# Patient Record
Sex: FEMALE | Race: WHITE | NOT HISPANIC OR LATINO | Employment: FULL TIME | URBAN - METROPOLITAN AREA
[De-identification: names, ages, dates, MRNs, and addresses within clinical notes are randomized per-mention and may not be internally consistent; named-entity substitution may affect disease eponyms.]

---

## 2017-07-28 PROBLEM — O02.1 ABORTION, MISSED: Status: ACTIVE | Noted: 2017-07-28

## 2017-11-20 ENCOUNTER — OFFICE VISIT (OUTPATIENT)
Dept: FAMILY MEDICINE | Facility: CLINIC | Age: 32
End: 2017-11-20
Payer: COMMERCIAL

## 2017-11-20 VITALS
DIASTOLIC BLOOD PRESSURE: 68 MMHG | RESPIRATION RATE: 14 BRPM | BODY MASS INDEX: 24.42 KG/M2 | HEART RATE: 60 BPM | WEIGHT: 143.06 LBS | HEIGHT: 64 IN | TEMPERATURE: 98 F | SYSTOLIC BLOOD PRESSURE: 115 MMHG | OXYGEN SATURATION: 98 %

## 2017-11-20 DIAGNOSIS — M62.838 MUSCLE SPASM: Primary | ICD-10-CM

## 2017-11-20 PROCEDURE — 99203 OFFICE O/P NEW LOW 30 MIN: CPT | Mod: S$GLB,,, | Performed by: NURSE PRACTITIONER

## 2017-11-20 PROCEDURE — 99999 PR PBB SHADOW E&M-NEW PATIENT-LVL III: CPT | Mod: PBBFAC,,, | Performed by: NURSE PRACTITIONER

## 2017-11-20 RX ORDER — CYCLOBENZAPRINE HCL 10 MG
10 TABLET ORAL 3 TIMES DAILY PRN
Qty: 30 TABLET | Refills: 0 | Status: SHIPPED | OUTPATIENT
Start: 2017-11-20 | End: 2017-11-30

## 2017-11-20 NOTE — PROGRESS NOTES
Subjective:       Patient ID: Donna Nguyen is a 32 y.o. female.  First time seeing pt in clinic.  New to [facility].     Chief Complaint: Arm Pain; Shoulder Pain; and Neck Pain  Right side: shoulder, arm,  and neck pain.     Right arm, shoulder and neck pain. A stabbing/spasm type pain.  It started in the middle of the night early Friday am. Pt reports she sleep without her pillow to help with the discomfort.  Denies numbness or tingling. She used heat and NSAIDs with mild relief.          Vitals:    11/20/17 1450   BP: 115/68   Pulse: 60   Resp: 14   Temp: 98.3 °F (36.8 °C)     Review of Systems   Constitutional: Negative for chills, fatigue and fever.   Respiratory: Negative for shortness of breath.    Cardiovascular: Negative for chest pain.   Musculoskeletal:        Right arm, shoulder, and neck pain.        Objective:      Physical Exam   Constitutional: She is oriented to person, place, and time. Vital signs are normal. She appears well-developed and well-nourished. She is cooperative.   HENT:   Head: Normocephalic and atraumatic.   Right Ear: Hearing normal.   Left Ear: Hearing normal.   Mouth/Throat: Mucous membranes are normal.   Eyes: Conjunctivae, EOM and lids are normal.   Neck: Normal range of motion. Neck supple. Muscular tenderness present.   To the right side hurts a little, but no limited ROM.    Cardiovascular: Normal rate, regular rhythm, S1 normal, S2 normal and normal heart sounds.    Pulmonary/Chest: Effort normal and breath sounds normal. No respiratory distress.   Musculoskeletal: Normal range of motion.        Cervical back: She exhibits tenderness and spasm.   Neurological: She is alert and oriented to person, place, and time.   Skin: Skin is warm, dry and intact.   Psychiatric: She has a normal mood and affect. Her speech is normal and behavior is normal. Judgment and thought content normal. Cognition and memory are normal.   Nursing note and vitals reviewed.       Assessment & Plan:       Muscle spasm  -     cyclobenzaprine (FLEXERIL) 10 MG tablet; Take 1 tablet (10 mg total) by mouth 3 (three) times daily as needed for Muscle spasms.  Dispense: 30 tablet; Refill: 0    May take ibuprofen OTC as directed for discomfort prn.      Return if symptoms worsen or fail to improve.

## 2018-03-27 ENCOUNTER — PATIENT MESSAGE (OUTPATIENT)
Dept: MATERNAL FETAL MEDICINE | Facility: CLINIC | Age: 33
End: 2018-03-27

## 2018-04-16 ENCOUNTER — PATIENT MESSAGE (OUTPATIENT)
Dept: MATERNAL FETAL MEDICINE | Facility: CLINIC | Age: 33
End: 2018-04-16

## 2018-04-16 PROBLEM — O46.8X2 SUBCHORIONIC HEMORRHAGE OF PLACENTA IN SECOND TRIMESTER: Status: ACTIVE | Noted: 2018-04-16

## 2018-04-16 PROBLEM — O35.03X0 CHOROID PLEXUS CYST OF FETUS AFFECTING CARE OF MOTHER, ANTEPARTUM: Status: ACTIVE | Noted: 2018-04-16

## 2018-04-17 ENCOUNTER — PATIENT MESSAGE (OUTPATIENT)
Dept: MATERNAL FETAL MEDICINE | Facility: CLINIC | Age: 33
End: 2018-04-17

## 2018-05-15 ENCOUNTER — PATIENT MESSAGE (OUTPATIENT)
Dept: MATERNAL FETAL MEDICINE | Facility: CLINIC | Age: 33
End: 2018-05-15

## 2018-05-17 ENCOUNTER — OFFICE VISIT (OUTPATIENT)
Dept: FAMILY MEDICINE | Facility: CLINIC | Age: 33
End: 2018-05-17
Payer: COMMERCIAL

## 2018-05-17 VITALS
BODY MASS INDEX: 25.82 KG/M2 | HEART RATE: 90 BPM | WEIGHT: 151.25 LBS | SYSTOLIC BLOOD PRESSURE: 110 MMHG | TEMPERATURE: 98 F | DIASTOLIC BLOOD PRESSURE: 60 MMHG | HEIGHT: 64 IN | OXYGEN SATURATION: 97 %

## 2018-05-17 DIAGNOSIS — H61.21 CERUMEN DEBRIS ON TYMPANIC MEMBRANE OF RIGHT EAR: Primary | ICD-10-CM

## 2018-05-17 DIAGNOSIS — H92.01 RIGHT EAR PAIN: ICD-10-CM

## 2018-05-17 DIAGNOSIS — Z3A.21 21 WEEKS GESTATION OF PREGNANCY: ICD-10-CM

## 2018-05-17 PROCEDURE — 3008F BODY MASS INDEX DOCD: CPT | Mod: CPTII,S$GLB,, | Performed by: NURSE PRACTITIONER

## 2018-05-17 PROCEDURE — 99213 OFFICE O/P EST LOW 20 MIN: CPT | Mod: S$GLB,,, | Performed by: NURSE PRACTITIONER

## 2018-05-17 PROCEDURE — 99999 PR PBB SHADOW E&M-EST. PATIENT-LVL IV: CPT | Mod: PBBFAC,,, | Performed by: NURSE PRACTITIONER

## 2018-05-17 RX ORDER — PROGESTERONE 100 MG/1
CAPSULE ORAL
COMMUNITY
Start: 2018-03-14 | End: 2018-06-04 | Stop reason: ALTCHOICE

## 2018-05-17 RX ORDER — TERCONAZOLE 4 MG/G
CREAM VAGINAL
COMMUNITY
Start: 2018-03-02 | End: 2018-06-04 | Stop reason: ALTCHOICE

## 2018-05-17 RX ORDER — NITROFURANTOIN 25; 75 MG/1; MG/1
CAPSULE ORAL
COMMUNITY
Start: 2018-03-16 | End: 2018-06-04 | Stop reason: ALTCHOICE

## 2018-05-17 RX ORDER — HYDROXYZINE PAMOATE 25 MG/1
CAPSULE ORAL
COMMUNITY
Start: 2018-04-03 | End: 2018-06-04 | Stop reason: ALTCHOICE

## 2018-05-17 NOTE — PROGRESS NOTES
Subjective:       Patient ID: Donna Nguyen is a 32 y.o. female.  Pt known to me, last seen on 11/20/2017.     Chief Complaint: Otalgia (right,  started last week)  Pt reports otalgia to right ear that started last week.  Denies swimming, recent URI, or fever.   Otalgia    There is pain in the right ear. This is a new problem. The current episode started in the past 7 days. There has been no fever. Associated symptoms include a sore throat (slight). Pertinent negatives include no abdominal pain, coughing, diarrhea, ear discharge, headaches, neck pain, rhinorrhea or vomiting. She has tried nothing for the symptoms.     Vitals:    05/17/18 1527   BP: 110/60   Pulse: 90   Temp: 98.4 °F (36.9 °C)     Review of Systems   HENT: Positive for ear pain and sore throat (slight). Negative for ear discharge and rhinorrhea.    Respiratory: Negative for cough.    Gastrointestinal: Negative for abdominal pain, diarrhea and vomiting.   Musculoskeletal: Negative for neck pain.   Neurological: Negative for headaches.       Objective:      Physical Exam   Constitutional: She is oriented to person, place, and time. Vital signs are normal. She appears well-developed and well-nourished. She is cooperative.   HENT:   Head: Normocephalic and atraumatic.   Right Ear: Hearing and external ear normal.   Left Ear: Hearing, tympanic membrane, external ear and ear canal normal.   Nose: Nose normal.   Mouth/Throat: Uvula is midline, oropharynx is clear and moist and mucous membranes are normal.   Unable to visualized TM of right ear, cerumen noted.    Eyes: Conjunctivae, EOM and lids are normal.   Neck: Normal range of motion.   Cardiovascular: Normal rate, regular rhythm, S1 normal, S2 normal, normal heart sounds and normal pulses.    Pulmonary/Chest: Effort normal and breath sounds normal. No respiratory distress.   Musculoskeletal: Normal range of motion.   Lymphadenopathy:        Head (right side): No submental, no submandibular, no  tonsillar, no preauricular and no posterior auricular adenopathy present.        Head (left side): No submental, no submandibular, no tonsillar, no preauricular and no posterior auricular adenopathy present.   Neurological: She is alert and oriented to person, place, and time.   Skin: Skin is warm and dry. Capillary refill takes less than 2 seconds.   Psychiatric: She has a normal mood and affect. Her speech is normal and behavior is normal. Judgment and thought content normal.   Nursing note and vitals reviewed.      Assessment & Plan:       Cerumen debris on tympanic membrane of right ear  -     Cancel: Ear wax removal  -     Ambulatory referral to ENT    Right ear pain  -     Ambulatory referral to ENT    21 weeks gestation of pregnancy  -     Ambulatory referral to ENT    Pt declined cerumen removal in office today, due to her concerns with current pregnancy.   Referred to ENT for further evaluation and treatment.   Pt scheduled with ENT on 5/23/2018.      Follow-up if symptoms worsen or fail to improve.

## 2018-05-28 DIAGNOSIS — O35.9XX0 SUSPECTED FETAL ABNORMALITY AFFECTING MANAGEMENT OF MOTHER, ANTEPARTUM, SINGLE OR UNSPECIFIED FETUS: Primary | ICD-10-CM

## 2018-06-04 ENCOUNTER — OFFICE VISIT (OUTPATIENT)
Dept: PEDIATRIC CARDIOLOGY | Facility: CLINIC | Age: 33
End: 2018-06-04
Attending: PEDIATRICS
Payer: COMMERCIAL

## 2018-06-04 ENCOUNTER — CLINICAL SUPPORT (OUTPATIENT)
Dept: PEDIATRIC CARDIOLOGY | Facility: CLINIC | Age: 33
End: 2018-06-04
Payer: COMMERCIAL

## 2018-06-04 VITALS
DIASTOLIC BLOOD PRESSURE: 70 MMHG | WEIGHT: 154.56 LBS | BODY MASS INDEX: 26.39 KG/M2 | HEIGHT: 64 IN | SYSTOLIC BLOOD PRESSURE: 118 MMHG | HEART RATE: 90 BPM

## 2018-06-04 DIAGNOSIS — O35.03X0 CHOROID PLEXUS CYST OF FETUS AFFECTING CARE OF MOTHER, ANTEPARTUM, SINGLE OR UNSPECIFIED FETUS: Primary | ICD-10-CM

## 2018-06-04 DIAGNOSIS — Z34.92 PREGNANT AND NOT YET DELIVERED IN SECOND TRIMESTER: ICD-10-CM

## 2018-06-04 DIAGNOSIS — O35.9XX0 SUSPECTED FETAL ABNORMALITY AFFECTING MANAGEMENT OF MOTHER, ANTEPARTUM, SINGLE OR UNSPECIFIED FETUS: ICD-10-CM

## 2018-06-04 PROCEDURE — 3008F BODY MASS INDEX DOCD: CPT | Mod: CPTII,S$GLB,, | Performed by: PEDIATRICS

## 2018-06-04 PROCEDURE — 99203 OFFICE O/P NEW LOW 30 MIN: CPT | Mod: 25,S$GLB,, | Performed by: PEDIATRICS

## 2018-06-04 PROCEDURE — 76825 ECHO EXAM OF FETAL HEART: CPT | Mod: S$GLB,,, | Performed by: PEDIATRICS

## 2018-06-04 PROCEDURE — 76827 ECHO EXAM OF FETAL HEART: CPT | Mod: S$GLB,,, | Performed by: PEDIATRICS

## 2018-06-04 PROCEDURE — 93325 DOPPLER ECHO COLOR FLOW MAPG: CPT | Mod: S$GLB,,, | Performed by: PEDIATRICS

## 2018-06-04 PROCEDURE — 99999 PR PBB SHADOW E&M-EST. PATIENT-LVL III: CPT | Mod: PBBFAC,,, | Performed by: PEDIATRICS

## 2018-06-04 NOTE — LETTER
June 5, 2018        Giuseppe Samuel MD  [address]  [facility] [address]             [facility]  [address]  [facility] [address]  Phone: [phone]  Fax: [phone]   Patient: Donna Nguyen   MR Number: [id number]   YOB: 1985   Date of Visit: 6/4/2018       Dear Dr. Samuel:    Thank you for referring Donna Nguyen to me for evaluation. Below are the relevant portions of my assessment and plan of care.            If you have questions, please do not hesitate to call me. I look forward to following Donna along with you.    Sincerely,      Kartik Michaud MD           CC  No Recipients

## 2018-06-05 PROBLEM — Z34.92 PREGNANT AND NOT YET DELIVERED IN SECOND TRIMESTER: Status: ACTIVE | Noted: 2018-06-05

## 2018-06-05 NOTE — PROGRESS NOTES
I had the pleasure of evaluating Donna Nguyen who is now 32 y.o.  and carrying her 3rd pregnancy at 23 3/7 weeks gestation. She was referred for evaluation of the fetal heart due to increased risks for congenital heart disease associated with fetal cystic hygroma with maternal cell free DNA testing reported as normal.    No current outpatient prescriptions on file.  Review of patient's allergies indicates:   Allergen Reactions    No known drug allergies        Maternal factors increasing risk for congenital heart disease: One previous fetal loss.  Paternal factors increasing risk for congenital heart disease: Unremarkable.    FETAL ECHOCARDIOGRAM (preliminary):  Normal fetal cardiac connections and function for EGA.  (Full report in electronic medical record)    I reviewed the strengths and weaknesses of fetal echocardiograph including the insufficient sensitivity to rule out many septal defects, anomalies of pulmonary and systemic veins, arch anomalies, and some valvar abnormalities. I also discussed that  resolution of the ductus arteriosus and foramen ovale (normal fetal structures) cannot be assured by fetal evaluation. Finally, I reviewed today's echocardiogram that demonstrates normal anatomical connections and function for the estimated gestational age. Within the limitations imposed by fetal physiology, I would expect a high probability that this infant will be born with a normal heart.    I reviewed the current opinion regarding choroid plexus cysts. Isolated choroid plexus cysts are present in 1 to 2 percent of the normal population. In the absence of other signs of a fetal abnormality there is no significant risk for aneuploidy. Some believe that the risk for aneuploidy in this low risk group is lower than the expected complication rate for amniocentesis and would not recommend this procedure. In addition, Ms. Nguyen has an unremarkable maternal cell free DNA test providing further evidence  that there is no aneuploidy. The risk for congenital heart disease in this circumstance is not different from an otherwise uncomplicated pregnancy.     First trimester cystic hygromas are often associated with trisomies (21,18,13), whereas second trimester cystic hygromas are often associated with monosomy X. About 50 percent of first trimester septated cystic hygromas occur in fetuses with chromosomal abnormalities, most commonly trisomy 21]. Chromosomal abnormalities appear to be more frequent in septated cystic hygromas than in nonseptated cystic hygromas (incidence of abnormalities 57 versus 21 percent) , although these findings have not been consistent across studies. As an example, one study of simple first trimester cystic hygromas measuring ?2 millimeters reported 60 percent were associated with abnormal karyotypes, with trisomy 21 occurring in one-fourth of cases. The lack of a standard definition of cystic hygroma and differences in adjustment for size likely contribute to disparate published results. Of note, about one-third of euploid fetuses with first trimester septated cystic hygromas have major structural anomalies . (Abstracted from Up To Date 10/2015)  As noted, Ms. Nguyen has an unremarkable maternal cell free DNA test providing further evidence that there is no aneuploidy.    I answered all the family's questions. I have not scheduled another evaluation; however, if questions arise later during gestation or after delivery, I would be happy to assist in any way. Ms. Nguyen is comfortable with the plan.    RECOMMENDATIONS:  Evaluation of the infant after delivery if the clinical exam is abnormal        Note:  Over 50% of visit in consultation with the family >30 minutes

## 2018-06-22 ENCOUNTER — PATIENT MESSAGE (OUTPATIENT)
Dept: MATERNAL FETAL MEDICINE | Facility: CLINIC | Age: 33
End: 2018-06-22

## 2018-06-22 ENCOUNTER — TELEPHONE (OUTPATIENT)
Dept: MATERNAL FETAL MEDICINE | Facility: CLINIC | Age: 33
End: 2018-06-22

## 2018-06-22 NOTE — TELEPHONE ENCOUNTER
Dr. Samuel contacting patient to discuss ultrasound concerns.    ----- Message from Tatianna Baker sent at 6/22/2018 10:13 AM CDT -----  Contact: Self  Pt can be reached at [phone]    Pt has a Question about a measurement on last US

## 2018-07-10 PROBLEM — O24.410 DIET CONTROLLED GESTATIONAL DIABETES MELLITUS (GDM) IN THIRD TRIMESTER: Status: ACTIVE | Noted: 2018-07-10

## 2018-09-03 PROBLEM — N89.8 VAGINAL DISCHARGE: Status: ACTIVE | Noted: 2018-09-03

## 2018-10-22 ENCOUNTER — PATIENT MESSAGE (OUTPATIENT)
Dept: MATERNAL FETAL MEDICINE | Facility: CLINIC | Age: 33
End: 2018-10-22

## 2018-11-01 LAB
HPV, HIGH-RISK: NEGATIVE
PAP SMEAR: NORMAL

## 2019-02-04 ENCOUNTER — OFFICE VISIT (OUTPATIENT)
Dept: PSYCHIATRY | Facility: CLINIC | Age: 34
End: 2019-02-04
Payer: COMMERCIAL

## 2019-02-04 VITALS
DIASTOLIC BLOOD PRESSURE: 72 MMHG | WEIGHT: 144.13 LBS | SYSTOLIC BLOOD PRESSURE: 123 MMHG | BODY MASS INDEX: 24.61 KG/M2 | HEART RATE: 64 BPM | HEIGHT: 64 IN

## 2019-02-04 DIAGNOSIS — F41.1 GAD (GENERALIZED ANXIETY DISORDER): ICD-10-CM

## 2019-02-04 PROCEDURE — 99999 PR PBB SHADOW E&M-EST. PATIENT-LVL III: ICD-10-PCS | Mod: PBBFAC,,, | Performed by: NURSE PRACTITIONER

## 2019-02-04 PROCEDURE — 99999 PR PBB SHADOW E&M-EST. PATIENT-LVL III: CPT | Mod: PBBFAC,,, | Performed by: NURSE PRACTITIONER

## 2019-02-04 PROCEDURE — 90792 PR PSYCHIATRIC DIAGNOSTIC EVALUATION W/MEDICAL SERVICES: ICD-10-PCS | Mod: S$GLB,,, | Performed by: NURSE PRACTITIONER

## 2019-02-04 PROCEDURE — 90792 PSYCH DIAG EVAL W/MED SRVCS: CPT | Mod: S$GLB,,, | Performed by: NURSE PRACTITIONER

## 2019-02-04 RX ORDER — LEVONORGESTREL AND ETHINYL ESTRADIOL 0.1-0.02MG
KIT ORAL
COMMUNITY
Start: 2019-02-02

## 2019-02-04 RX ORDER — SERTRALINE HYDROCHLORIDE 100 MG/1
TABLET, FILM COATED ORAL
COMMUNITY
Start: 2019-01-09 | End: 2019-04-11

## 2019-02-04 NOTE — PROGRESS NOTES
"Outpatient Psychiatry Initial Visit  02/04/2019    ID: 33 year old , female presenting for an initial evaluation. Met with patient.    Reason for encounter: Self-referral. Patient complains of anxiety.    History of Present Illness:  Pt. is a  34 y/o female with a hx of anxiety, presenting to the clinic for an initial evaluation and treatment. She is currently taking Zoloft 100mg QD, which was prescribed by her OB-GYN following the birth of her second child on 09/03/2018, due to post-partum depression. She reports that this medication has possibly made her anxiety slightly worse. It has also made her "apathetic and emotionless." She reports that she would like to d/c the Zoloft and try something new. Previous med trials include Lexapro (prescribed at age 17 for anxiety, reports efficacy), Pristiq (was unsure if this worked due to concurrent use with Vyvanse), and Vyvanse.     Pt reports being an anxious person most of her life. "I was one of those people who thought they had every sickness in the book. I was always worried about getting cancer. I would watch the news and get worried that things happening on TV would happen to me and my . I would have a difficult time going to the grocery store because I was afraid of getting robbed or something. I've never really been depressed, just had lots of anxiety throughout my life."    Had a miscarriage in July of 2017. She felt that the anxiety stemming from this would prevent her from having another child. However, pt became pregnant again in early 2018. A few months before her due date, her OBGYN discovered a cystic hygroma, but this was resolved and pt had a successful delivery of a healthy baby.     I don't worry about my own health anymore. Now, I just have this extreme anxiety that my children are going to get sick. For some reason, I am always worried that they are going to get cancer or something. The worry just consumes me. I don't have any energy or " "motivation anymore. I can't concentrate on anything."    "I do find myself getting more irritable with people recently"     Pt currently endorses or denies the following symptoms:  Psych ROS:  Depression: Mood: "ok", Sleep: 9 hours sleep nightly, + anhedonia, no guilt, dec'd energy, dec'd concentration, good appetite, no psychomotor slowing, no SI  Anxiety: no panic attacks, some agoraphobia, no social anxiety, lots of generalized worry, + muscle tension in shoulders, + irritability, + fatigue, decreased concentration  PTSD: no flashbacks, nightmares, or avoidance of stimuli  Deysi/Psychosis: No manic episodes, no A/V hallucinations  SI - No SI    Past Psychiatric History:  Past Psych Hx: First psych contact @ age 17 - dx anxiety. No prior hospitalizations. No prior suicide attempts or self harm.  Prior diagnosis: RENETTA  Prior meds: Pristiq, Lexapro, Vyvanse  Current meds: None  Prior psychotherapy: Yes, after miscarriage       Past Medical Hx: Hx of TBI?  No   Hx of seizures? No    Past Surgical Hx: Two C-sections, one DNC    Family Hx:   Maternal - no family hx of mental illness, suicide, or substance abuse  Paternal - dad deals with anxiety, no substance abuse or suicidal hx    Social Hx:   Childhood: B/R in [facility], by both parents, only child  Marital Status:  x 5 years to Jose. He owns a business in [employer]  Children: 2 children one miscarriage in 2017  Resides: [place]  Occupation: Stay-at-home mom  Hobbies: None  Scientologist: Protestant  Education level:  degree  : No  Legal: No    Substance Hx:  Tobacco: No, former smoker  - last used 10 years ago  Alcohol: Drinks a glass of wine every night  Drug use: none  Caffeine: 1-2 cups coffee daily  Rehab: no  Prior current AA? No    Review of Symptoms  GENERAL: no weight gain/loss  SKIN: no rashes or lacerations  HEAD: no headahces  EYES: no jaundice, blindness. No exophthalmos  EARS: no dizziness, tinnitus, or hearing " "loss  NOSE: no changes in smell  Mouth/throat: no dyskinetic movements or obvious goiter  CHEST: no SOB, hyperventilation or cough  CARDIO: no tachycardia, bradycardia, or chest pain  ABDOMEN: no nausea, vomiting, pain, constipation, or diarrhea  URINARY:  no frequency, dysuria, or sexual dysfunction  ENDOCRINE: No polydipsia, polyuria, no cold/hot intolerance  MUSCULOSKELETAL: no joint pain/stiffness  NEUROLOGIC: no weakness or sensory changes, no seizures, no confusion, memory loss, or forgetfulness, no tremor or abnormal movements    Current Evaluation:  Nutritional Screening:  Considering the patient's height and weight, medications, medical history and preferences, should a referral be made to the dietitian? No  Vitals: most recent vitals signs, dated greater than 90 days prior to this appointment, were reviewed  General: age appropriate, well nourished, casually dressed, neatly groomed  MSK: muscle strength/tone : no tremor or abnormal movements. Gait/Station: no ataxic, steady    Suicide Risk Assessment:  Protective factors: age, gender, no prior attempts, no prior hospitalizations, no ongoing substance abuse, no psychosis, , has children, denies SI/intent/plan, seeking treatment, access to treatment, future oriented, good primary support, no access to firearms.    Patient is a low immediate and long-term risk considering risk factors    Psychiatric:  Speech: Normal rate, rhythm, volume. No latency, no pressured speech  Mood/Affect: depressed, congruent and appropriate   Though Process: organized, logical, linear  Thought Content: no suicidal or homicidal ideation, no A/V hallucinations, delusions or paranoia  Insight: Intact; aware of illness  Judgement: behavior is adequate to circumstances  Orientation: A&O x 4  Memory: Intact for content of interview, 3/3 immediate, 3/3 after 3 mins. Able to recall recent and remote events.  Language: Grossly intact, no aphasias   Concentration: Spells "world" " "correctly forward & backwards  Knowledge/Intelligence: appropriate to age and level of education.   Spouse/Partner: Supportive    ASSESSMENT - DIAGNOSIS - TREATMENT PLAN  Impression: Pt. is a  34 y/o female with a hx of anxiety, presenting to the clinic for an initial evaluation and treatment. She has a history of miscarriage in 2017. She is currently taking Zoloft 100mg QD, which was prescribed by her OB-GYN following the birth of her second child on 09/03/2018, due to post-partum depression. She reports that this medication has possibly made her anxiety slightly worse. It has also made her "apathetic and emotionless." She reports that she would like to d/c the Zoloft and try something new. Previous med trials include Lexapro (prescribed at age 17 for anxiety, reports efficacy), Pristiq (was unsure if this worked due to concurrent use with Vyvanse), and Vyvanse.     Pt has long experienced anxiety and characterizes herself as a "worrier". Following a miscarriage in 2017, her constant worry of herself getting cancer has now turned into worry about her children getting cancer. She endorses low motivation, poor energy, poor concentration, irritability, muscle tension.     Pt explains that she would like to taper of the Zoloft and try something new to treat her anxiety.    Safe for outpatient tx and no acute safety concerns.  Diagnosis/Diagnoses: RENETTA    Strengths/Liabilities: Patient accepts feedback & guidance. Patient is motivated for change.     Treatment Goals: Specify outcomes written in observable, behavioral terms  Anxiety: acquire relapse prevention skills, reduce physical symptoms of anxiety, reduce time spent worrying (>30 minutes/day)    Treatment Plan/Recommendations:   Medication Management: The risks and benefits of medication were discussed with the patient.   Meds:  1) Begin taper off Zoloft. Pt instructed to take 1/2 of her 100mg tablet  x 2 weeks, then stop. Pt expresses desire to taper off this       "       medication sooner rather than later.     Labs: none ordered at today's visit  Return to Clinic: 4 weeks  Counseling time: 35 mins  Total time: 60 mins  - Call to report any worsening of symptoms or problems associated with medication  - Pt instructed to go to ER if thoughts of harming self or others arise     -Spent 60min face to face with the pt; >50% time spent in counseling   -Supportive therapy and psychoeducation provided  -R/B/SE's of medications discussed with the pt who expresses understanding and chooses to take medications as prescribed.   -Pt instructed to call clinic, 911 or go to nearest emergency room if sxs worsen or pt is in   crisis. The pt expresses understanding.    Waldemar Michele, NP

## 2019-02-28 ENCOUNTER — PATIENT MESSAGE (OUTPATIENT)
Dept: PSYCHIATRY | Facility: CLINIC | Age: 34
End: 2019-02-28

## 2019-03-07 ENCOUNTER — OFFICE VISIT (OUTPATIENT)
Dept: PSYCHIATRY | Facility: CLINIC | Age: 34
End: 2019-03-07
Payer: COMMERCIAL

## 2019-03-07 VITALS
WEIGHT: 144 LBS | HEIGHT: 64 IN | BODY MASS INDEX: 24.59 KG/M2 | SYSTOLIC BLOOD PRESSURE: 114 MMHG | DIASTOLIC BLOOD PRESSURE: 61 MMHG | HEART RATE: 76 BPM

## 2019-03-07 DIAGNOSIS — F41.1 GAD (GENERALIZED ANXIETY DISORDER): Primary | ICD-10-CM

## 2019-03-07 PROCEDURE — 99213 OFFICE O/P EST LOW 20 MIN: CPT | Mod: S$GLB,,, | Performed by: NURSE PRACTITIONER

## 2019-03-07 PROCEDURE — 99213 PR OFFICE/OUTPT VISIT, EST, LEVL III, 20-29 MIN: ICD-10-PCS | Mod: S$GLB,,, | Performed by: NURSE PRACTITIONER

## 2019-03-07 PROCEDURE — 3008F BODY MASS INDEX DOCD: CPT | Mod: CPTII,S$GLB,, | Performed by: NURSE PRACTITIONER

## 2019-03-07 PROCEDURE — 90833 PSYTX W PT W E/M 30 MIN: CPT | Mod: S$GLB,,, | Performed by: NURSE PRACTITIONER

## 2019-03-07 PROCEDURE — 3008F PR BODY MASS INDEX (BMI) DOCUMENTED: ICD-10-PCS | Mod: CPTII,S$GLB,, | Performed by: NURSE PRACTITIONER

## 2019-03-07 PROCEDURE — 90833 PR PSYCHOTHERAPY W/PATIENT W/E&M, 30 MIN (ADD ON): ICD-10-PCS | Mod: S$GLB,,, | Performed by: NURSE PRACTITIONER

## 2019-03-07 PROCEDURE — 99999 PR PBB SHADOW E&M-EST. PATIENT-LVL III: CPT | Mod: PBBFAC,,, | Performed by: NURSE PRACTITIONER

## 2019-03-07 PROCEDURE — 99999 PR PBB SHADOW E&M-EST. PATIENT-LVL III: ICD-10-PCS | Mod: PBBFAC,,, | Performed by: NURSE PRACTITIONER

## 2019-03-07 RX ORDER — BUPROPION HYDROCHLORIDE 150 MG/1
150 TABLET ORAL DAILY
Qty: 30 TABLET | Refills: 3 | Status: SHIPPED | OUTPATIENT
Start: 2019-03-07 | End: 2019-04-11

## 2019-03-07 NOTE — PROGRESS NOTES
"Outpatient Psychiatry Follow-Up Visit    Clinical Status of Patient: Outpatient (Ambulatory)  03/07/2019     Chief Complaint: Pt is a 33 year old female who presents today for a follow-up. Met with patient.       Interval History and Content of Current Session:  Interim Events/Subjective Report/Content of Current Session: follow up appointment.    Pt is a with past psychiatric hx of RENETTA presents for follow up treatment. Per pt request, we started tapering off Zoloft during our last appointment. Today, she has completely tapered off and has tolerated well. Pt reached out to me via [employer] with questions about Wellbutrin, and expresses her desire to start trial of of this. Pt was informed that Wellbutrin-XL is not FDA approved to treat RENETTA, but she insists that we try this, and that she does frequently experience a depressed mood.      "I did a lot of research on Wellbutrin, and it sounds like something I'd like to try. I read that it sometimes can make people more anxious, but I think I can handle that. I've also had trouble staying focused, since I was a child." She also speaks to not wanting the emotional flattening effects of SSRIs.     Sleep is good, has been using magnesium to get to sleep. Continues to experience some fatigue, and dec'd concentration. Appetite is good.       Past Psychiatric hx: Pt. is a  34 y/o female with a hx of anxiety, presenting to the clinic for an initial evaluation and treatment. She has a history of miscarriage in 2017. She is currently taking Zoloft 100mg QD, which was prescribed by her OB-GYN following the birth of her second child on 09/03/2018, due to post-partum depression. She reports that this medication has possibly made her anxiety slightly worse. It has also made her "apathetic and emotionless." She reports that she would like to d/c the Zoloft and try something new. Previous med trials include Lexapro (prescribed at age 17 for anxiety, reports efficacy), Pristiq (was unsure " "if this worked due to concurrent use with Vyvanse), and Vyvanse.      Pt has long experienced anxiety and characterizes herself as a "worrier". Following a miscarriage in 2017, her constant worry of herself getting cancer has now turned into worry about her children getting cancer. She endorses low motivation, poor energy, poor concentration, irritability, muscle tension.       Past Medical hx:   Past Medical History:   Diagnosis Date    ADD (attention deficit disorder)     Anxiety     Cystic hygroma     Diabetes mellitus     gdm        Interim hx:  Medication changes last visit: Taper off Zoloft per pt request     Anxiety: 6-7/10  Depression: 5/10     Denies suicidal/homicidal ideations.  Denies hopelessness/worthlessness.    Denies auditory/visual hallucinations      Tobacco: No, former smoker  - last used 10 years ago  Alcohol: Drinks a glass of wine every night  Drug use: none  Caffeine: 1-2 cups coffee daily    Review of Systems   · PSYCHIATRIC: Pertinent items are noted in the narrative.        CONSTITUTIONAL: weight stable        M/S: no pain today         ENT: no allergies noted today        ABD: no n/v/d     Past Medical, Family and Social History: The patient's past medical, family and social history have been reviewed and updated as appropriate within the electronic medical record. See encounter notes.     Medication: Recently completed taper off of Zoloft, per pt request     Compliance: yes      Side effects: tolerated taper well     Risk Parameters:  Patient reports no suicidal ideation  Patient reports no homicidal ideation  Patient reports no self-injurious behavior  Patient reports no violent behavior     Exam (detailed: at least 9 elements; comprehensive: all 15 elements)   Constitutional  Vitals:  Most recent vital signs, dated less than 90 days prior to this appointment, were reviewed. /61 (BP Location: Left arm)   Pulse 76   Ht 5' 4" (1.626 m)   Wt 65.3 kg (144 lb)   LMP 03/06/2019 " "(Exact Date)   BMI 24.72 kg/m²       General:  unremarkable, age appropriate, casual attire, good eye contact, good rapport       Musculoskeletal  Muscle Strength/Tone:  no flaccidity, no tremor    Gait & Station:  normal      Psychiatric                       Speech:  normal tone, normal rate, rhythm, and volume   Mood & Affect:   euthymic, congruent, appropriate         Thought Process:   Goal directed; Linear    Associations:   intact   Thought Content:   No SI/HI, delusions, or paranoia, no AV/VH   Insight & Judgement:   Good, adequate to circumstances   Orientation:   grossly intact; alert and oriented x 4    Memory:  intact for content of interview    Language:  grossly intact, can repeat    Attention Span  : Grossly intact for content of interview   Fund of Knowledge:   intact and appropriate to age and level of education      /61 (BP Location: Left arm)   Pulse 76   Ht 5' 4" (1.626 m)   Wt 65.3 kg (144 lb)   LMP 03/06/2019 (Exact Date)   BMI 24.72 kg/m²     Assessment and Diagnosis   Status/Progress: Based on the examination today, the patient's problem(s) is/are under fair control.  New problems have not been presented today. Comorbidities are not currently complicating management of the primary condition.      Impression:      Pt is a with past psychiatric hx of RENETTA presents for follow up treatment. Per pt request, we started tapering off Zoloft during our last appointment. Today, she has completely tapered off and has tolerated well. Pt reached out to me via [employer] with questions about Wellbutrin, and expresses her desire to start trial of of this. Pt was informed that Wellbutrin-XL is not FDA approved to treat RENETTA, but she insists that we try this, and that she does frequently experience a depressed mood.      Today, pt states that she does frequently experience a depressed mood.    Sleep is good, has been using magnesium to get to sleep. Continues to experience some fatigue, and dec'd " concentration. Appetite is good.       Diagnosis: RENETTA    Intervention/Counseling/Treatment Plan   · Medication Management:      1. Initiate Wellbutrin-XL 150mg QD per pt request. Pt educated on side effects that may include: insomnia, tremor, agitation, headache, dizziness. Also that she may experience dry mouth, constipation, or nausea. Pt verbalizes her understanding     2. Call to report any worsening of symptoms or problems with the medication. Pt instructed to go to ER with thoughts of harming self, others     3. Patient given contact # for psychotherapists at [facility] and also instructed she may check with insurance for list of providers.     6. Labs: no new orders    Psychotherapy:   · Target symptoms: anxiety  · Why chosen therapy is appropriate versus another modality: relevant to diagnosis, patient responds to this modality  · Outcome monitoring methods: self-report, observation, feedback from family   · Therapeutic intervention type: supportive psychotherapy  · Topics discussed/themes: building skills sets for symptom management, symptom recognition, nutrition, exercise  · The patient's response to the intervention is accepting. The patient's progress toward treatment goals is positive progress.  · Duration of intervention: 20 minutes     Return to clinic: 4 weeks    -Spent 30min face to face with the pt; >50% time spent in counseling   -Supportive therapy and psychoeducation provided  -R/B/SE's of medications discussed with the pt who expresses understanding and chooses to take medications as prescribed.   -Pt instructed to call clinic, 911 or go to nearest emergency room if sxs worsen or pt is in   crisis. The pt expresses understanding.    Waldemar Michele, NP

## 2019-04-11 ENCOUNTER — OFFICE VISIT (OUTPATIENT)
Dept: PSYCHIATRY | Facility: CLINIC | Age: 34
End: 2019-04-11
Payer: COMMERCIAL

## 2019-04-11 VITALS
DIASTOLIC BLOOD PRESSURE: 67 MMHG | HEART RATE: 80 BPM | RESPIRATION RATE: 16 BRPM | BODY MASS INDEX: 23.9 KG/M2 | SYSTOLIC BLOOD PRESSURE: 110 MMHG | WEIGHT: 140 LBS | HEIGHT: 64 IN

## 2019-04-11 DIAGNOSIS — F41.1 GAD (GENERALIZED ANXIETY DISORDER): Primary | ICD-10-CM

## 2019-04-11 PROCEDURE — 90833 PSYTX W PT W E/M 30 MIN: CPT | Mod: S$GLB,,, | Performed by: NURSE PRACTITIONER

## 2019-04-11 PROCEDURE — 99999 PR PBB SHADOW E&M-EST. PATIENT-LVL III: CPT | Mod: PBBFAC,,, | Performed by: NURSE PRACTITIONER

## 2019-04-11 PROCEDURE — 99213 OFFICE O/P EST LOW 20 MIN: CPT | Mod: S$GLB,,, | Performed by: NURSE PRACTITIONER

## 2019-04-11 PROCEDURE — 90833 PR PSYCHOTHERAPY W/PATIENT W/E&M, 30 MIN (ADD ON): ICD-10-PCS | Mod: S$GLB,,, | Performed by: NURSE PRACTITIONER

## 2019-04-11 PROCEDURE — 3008F PR BODY MASS INDEX (BMI) DOCUMENTED: ICD-10-PCS | Mod: CPTII,S$GLB,, | Performed by: NURSE PRACTITIONER

## 2019-04-11 PROCEDURE — 99213 PR OFFICE/OUTPT VISIT, EST, LEVL III, 20-29 MIN: ICD-10-PCS | Mod: S$GLB,,, | Performed by: NURSE PRACTITIONER

## 2019-04-11 PROCEDURE — 3008F BODY MASS INDEX DOCD: CPT | Mod: CPTII,S$GLB,, | Performed by: NURSE PRACTITIONER

## 2019-04-11 PROCEDURE — 99999 PR PBB SHADOW E&M-EST. PATIENT-LVL III: ICD-10-PCS | Mod: PBBFAC,,, | Performed by: NURSE PRACTITIONER

## 2019-04-11 RX ORDER — BUPROPION HYDROCHLORIDE 150 MG/1
150 TABLET, EXTENDED RELEASE ORAL DAILY
Qty: 30 TABLET | Refills: 3 | Status: SHIPPED | OUTPATIENT
Start: 2019-04-11 | End: 2019-05-09 | Stop reason: SDUPTHER

## 2019-04-11 NOTE — PROGRESS NOTES
"Outpatient Psychiatry Follow-Up Visit    Clinical Status of Patient: Outpatient (Ambulatory)  04/11/2019     Chief Complaint: Pt is a 33 year old female who presents today for a follow-up. Met with patient.       Interval History and Content of Current Session:  Interim Events/Subjective Report/Content of Current Session: follow up appointment.    Pt is a 33 year old female with past psychiatric hx of RENETTA who presents for follow up treatment. She is currently taking Wellbutrin-XL 150mg QD which was started during her last visit one month ago. She also reports having recently completed taper off Zoloft. Pt has been adamant about monotherapy with Wellbutrin due various forums and advice she has read online.    Pt reports she has tolerated Wellbutrin-XL trial well, but has noticed a moderate mood lift. She states that she has been reading forums where people have reported better results with SR as opposed to XL. She expressed her desire to be switched from XL to SR today.    She reports an overall decrease in anxiety levels since our last visit. She also endorses "times where I just get happy" throughout the day. Fatigue has been problematic for her in the past, but she feels the Wellbutrin has helped with this.    Sleep is good, appetite is good. Has noticed mild improvement in concentration since starting Wellbutrin.    Pt stable on current meds.         Past Psychiatric hx: : Pt. is a  34 y/o female with a hx of anxiety, presenting to the clinic for an initial evaluation and treatment. She has a history of miscarriage in 2017. She came to me taking Zoloft 100mg QD, which was prescribed by her OB-GYN following the birth of her second child on 09/03/2018, due to post-partum depression. She reports that this medication has possibly made her anxiety slightly worse. It has also made her "apathetic and emotionless." She reports that she would like to d/c the Zoloft and try something new. Previous med trials include " "Lexapro (prescribed at age 17 for anxiety, reports efficacy), Pristiq (was unsure if this worked due to concurrent use with Vyvanse), and Vyvanse.      Pt has long experienced anxiety and characterizes herself as a "worrier". Following a miscarriage in 2017, her constant worry of herself getting cancer has now turned into worry about her children getting cancer. She endorses low motivation, poor energy, poor concentration, irritability, muscle tension.          Past Medical hx:   Past Medical History:   Diagnosis Date    ADD (attention deficit disorder)     Anxiety     Cystic hygroma     Diabetes mellitus     gdm        Interim hx:  Medication changes last visit: Start Wellbutrin-XL  Anxiety: 3/10  Depression: 5/10     Denies suicidal/homicidal ideations.  Denies hopelessness/worthlessness.    Denies auditory/visual hallucinations      Alcohol: no change  Drug: no change   Caffeine: no change  Tobacco: no change      Review of Systems   · PSYCHIATRIC: Pertinent items are noted in the narrative.        CONSTITUTIONAL: weight stable        M/S: no pain today         ENT: no allergies noted today        ABD: no n/v/d     Past Medical, Family and Social History: The patient's past medical, family and social history have been reviewed and updated as appropriate within the electronic medical record. See encounter notes.     Medication: Wellbutrin-XL 150mg QD     Compliance: yes      Side effects: tolerates     Risk Parameters:  Patient reports no suicidal ideation  Patient reports no homicidal ideation  Patient reports no self-injurious behavior  Patient reports no violent behavior     Exam (detailed: at least 9 elements; comprehensive: all 15 elements)   Constitutional  Vitals:  Most recent vital signs, dated less than 90 days prior to this appointment, were reviewed. /67 (BP Location: Left arm, Patient Position: Sitting)   Pulse 80   Resp 16   Ht 5' 4" (1.626 m)   Wt 63.5 kg (140 lb)   LMP 04/04/2019 " "(Approximate)   BMI 24.03 kg/m²       General:  unremarkable, age appropriate, casual attire, good eye contact, good rapport       Musculoskeletal  Muscle Strength/Tone:  no flaccidity, no tremor    Gait & Station:  normal      Psychiatric                       Speech:  normal tone, normal rate, rhythm, and volume   Mood & Affect:   Euthymic, congruent, appropriate         Thought Process:   Goal directed; Linear    Associations:   intact   Thought Content:   No SI/HI, delusions, or paranoia, no AV/VH   Insight & Judgement:   Good, adequate to circumstances   Orientation:   grossly intact; alert and oriented x 4    Memory:  intact for content of interview    Language:  grossly intact, can repeat    Attention Span  : Grossly intact for content of interview   Fund of Knowledge:   intact and appropriate to age and level of education        Assessment and Diagnosis   Status/Progress: Based on the examination today, the patient's problem(s) is/are under fair control.  New problems have not been presented today. Comorbidities are not currently complicating management of the primary condition.      Impression:Pt is a 33 year old female with past psychiatric hx of RENETTA who presents for follow up treatment. She is currently taking Wellbutrin-XL 150mg QD which was started during her last visit one month ago. She also reports having recently completed taper off Zoloft. Pt has been adamant about monotherapy with Wellbutrin due various forums and advice she has read online.    Pt reports she has tolerated Wellbutrin-XL trial well, but has noticed a moderate mood lift. She states that she has been reading forums where people have reported better results with SR as opposed to XL. She expressed her desire to be switched from XL to SR today.    She reports an overall decrease in anxiety levels since our last visit. She also endorses "times where I just get happy" throughout the day. Fatigue has been problematic for her in the past, " but she feels the Wellbutrin has helped with this.    Sleep is good, appetite is good. Has noticed mild improvement in concentration since starting Wellbutrin.    Pt stable on current meds.       Diagnosis: RENETTA    Intervention/Counseling/Treatment Plan   · Medication Management:      1. Direct switch to Wellbutrin-SR 150mg QD per pt request.      2. Call to report any worsening of symptoms or problems with the medication. Pt instructed to go to ER with thoughts of harming self, others     3. Patient given contact # for psychotherapists at [facility] and also instructed she may check with insurance for list of providers.      4. Labs: no new orders     Psychotherapy:   · Target symptoms: anxiety  · Why chosen therapy is appropriate versus another modality: relevant to diagnosis, patient responds to this modality  · Outcome monitoring methods: self-report, observation, feedback from family   · Therapeutic intervention type: supportive psychotherapy  · Topics discussed/themes: building skills sets for symptom management, symptom recognition, nutrition, exercise  · The patient's response to the intervention is accepting. The patient's progress toward treatment goals is positive progress.  · Duration of intervention: 20 minutes     Return to clinic: 4 weeks    -Spent 30min face to face with the pt; >50% time spent in counseling   -Supportive therapy and psychoeducation provided  -R/B/SE's of medications discussed with the pt who expresses understanding and chooses to take medications as prescribed.   -Pt instructed to call clinic, 911 or go to nearest emergency room if sxs worsen or pt is in   crisis. The pt expresses understanding.    Waldemar Michele, NP

## 2019-05-09 ENCOUNTER — OFFICE VISIT (OUTPATIENT)
Dept: PSYCHIATRY | Facility: CLINIC | Age: 34
End: 2019-05-09
Payer: COMMERCIAL

## 2019-05-09 VITALS
HEART RATE: 79 BPM | WEIGHT: 137.31 LBS | RESPIRATION RATE: 16 BRPM | HEIGHT: 64 IN | SYSTOLIC BLOOD PRESSURE: 119 MMHG | BODY MASS INDEX: 23.44 KG/M2 | DIASTOLIC BLOOD PRESSURE: 78 MMHG

## 2019-05-09 DIAGNOSIS — F41.1 GAD (GENERALIZED ANXIETY DISORDER): Primary | ICD-10-CM

## 2019-05-09 PROCEDURE — 90833 PR PSYCHOTHERAPY W/PATIENT W/E&M, 30 MIN (ADD ON): ICD-10-PCS | Mod: S$GLB,,, | Performed by: NURSE PRACTITIONER

## 2019-05-09 PROCEDURE — 90833 PSYTX W PT W E/M 30 MIN: CPT | Mod: S$GLB,,, | Performed by: NURSE PRACTITIONER

## 2019-05-09 PROCEDURE — 3008F BODY MASS INDEX DOCD: CPT | Mod: CPTII,S$GLB,, | Performed by: NURSE PRACTITIONER

## 2019-05-09 PROCEDURE — 99999 PR PBB SHADOW E&M-EST. PATIENT-LVL III: ICD-10-PCS | Mod: PBBFAC,,, | Performed by: NURSE PRACTITIONER

## 2019-05-09 PROCEDURE — 99213 PR OFFICE/OUTPT VISIT, EST, LEVL III, 20-29 MIN: ICD-10-PCS | Mod: S$GLB,,, | Performed by: NURSE PRACTITIONER

## 2019-05-09 PROCEDURE — 99999 PR PBB SHADOW E&M-EST. PATIENT-LVL III: CPT | Mod: PBBFAC,,, | Performed by: NURSE PRACTITIONER

## 2019-05-09 PROCEDURE — 99213 OFFICE O/P EST LOW 20 MIN: CPT | Mod: S$GLB,,, | Performed by: NURSE PRACTITIONER

## 2019-05-09 PROCEDURE — 3008F PR BODY MASS INDEX (BMI) DOCUMENTED: ICD-10-PCS | Mod: CPTII,S$GLB,, | Performed by: NURSE PRACTITIONER

## 2019-05-09 RX ORDER — BUPROPION HYDROCHLORIDE 200 MG/1
200 TABLET, EXTENDED RELEASE ORAL DAILY
Qty: 30 TABLET | Refills: 3 | Status: SHIPPED | OUTPATIENT
Start: 2019-05-09 | End: 2019-08-31 | Stop reason: SDUPTHER

## 2019-05-09 NOTE — PROGRESS NOTES
"Outpatient Psychiatry Follow-Up Visit    Clinical Status of Patient: Outpatient (Ambulatory)  05/09/2019     Chief Complaint: Pt is a 33 year old female who presents today for a follow-up. Met with patient.       Interval History and Content of Current Session:  Interim Events/Subjective Report/Content of Current Session: follow up appointment.    Pt is a 33 year old female with past psychiatric hx of RENETTA who presents for follow up treatment. She is currently taking Wellbutrin-XL 150mg QD which was started during her last visit one month ago. She also reports having recently completed taper off Zoloft. Pt has been adamant about monotherapy with Wellbutrin due to reading various sources and forums online. At her last visit one month ago Pt reports she has tolerated Wellbutrin-XL trial well, and has noticed a moderate mood lift. She states that she has been reading forums where people have reported better results with SR as opposed to XL. She expressed her desire to be switched from XL to SR at this visit. Since switching from XL to SR formulations, she reports a modest improvement in mood and anxiety levels. She also endorses "times where I just get happy" throughout the day. Fatigue has been problematic for her in the past, but she feels the Wellbutrin has helped with this. Pt states "I was just driving around the other day and told my , wow what a beautiful day. He was like wow, where did that come from?"    Fatigue and concentration continue to be problematic for her at times, and still experiences some "down days" with no identifiable trigger.     She continues to have some generalized worry and catastrophic thinking, however this has reduced in frequency and severity     Sleep is good, appetite is good. Has noticed continued improvement with Wellbutrin. Stable on current meds.        Past Psychiatric hx: : Pt. is a  32 y/o female with a hx of anxiety, presenting to the clinic for an initial evaluation and " "treatment. She has a history of miscarriage in 2017. She came to me taking Zoloft 100mg QD, which was prescribed by her OB-GYN following the birth of her second child on 09/03/2018, due to post-partum depression. She reports that this medication has possibly made her anxiety slightly worse. It has also made her "apathetic and emotionless." She reports that she would like to d/c the Zoloft and try something new. Previous med trials include Lexapro (prescribed at age 17 for anxiety, reports efficacy), Pristiq (was unsure if this worked due to concurrent use with Vyvanse), and Vyvanse.      Pt has long experienced anxiety and characterizes herself as a "worrier". Following a miscarriage in 2017, her constant worry of herself getting cancer has now turned into worry about her children getting cancer. She endorses low motivation, poor energy, poor concentration, irritability, muscle tension.        Past Medical hx:   Past Medical History:   Diagnosis Date    ADD (attention deficit disorder)     Anxiety     Cystic hygroma     Diabetes mellitus     gdm        Interim hx:  Medication changes last visit: Start Wellbutrin-XL  Anxiety: 5/10  Depression: 4/10     Denies suicidal/homicidal ideations.  Denies hopelessness/worthlessness.    Denies auditory/visual hallucinations      Alcohol: 4-5 nights weekly, one drink of liquor nightly  Drug: denies  Caffeine: 1 cup coffee daily  Tobacco: denies      Review of Systems   · PSYCHIATRIC: Pertinent items are noted in the narrative.        CONSTITUTIONAL: weight stable        M/S: no pain today         ENT: no allergies noted today        ABD: no n/v/d     Past Medical, Family and Social History: The patient's past medical, family and social history have been reviewed and updated as appropriate within the electronic medical record. See encounter notes.     Medication: Wellbutrin SR 200mg QD     Compliance: yes      Side effects: tolerates     Risk Parameters:  Patient reports no " "suicidal ideation  Patient reports no homicidal ideation  Patient reports no self-injurious behavior  Patient reports no violent behavior     Exam (detailed: at least 9 elements; comprehensive: all 15 elements)   Constitutional  Vitals:  Most recent vital signs, dated less than 90 days prior to this appointment, were reviewed. /78 (BP Location: Left arm, Patient Position: Sitting)   Pulse 79   Resp 16   Ht 5' 4" (1.626 m)   Wt 62.3 kg (137 lb 4.8 oz)   BMI 23.57 kg/m²      General:  unremarkable, age appropriate, casual attire, good eye contact, good rapport       Musculoskeletal  Muscle Strength/Tone:  no flaccidity, no tremor    Gait & Station:  normal      Psychiatric                       Speech:  normal tone, normal rate, rhythm, and volume   Mood & Affect:   Euthymic, congruent, appropriate         Thought Process:   Goal directed; Linear    Associations:   intact   Thought Content:   No SI/HI, delusions, or paranoia, no AV/VH   Insight & Judgement:   Good, adequate to circumstances   Orientation:   grossly intact; alert and oriented x 4    Memory:  intact for content of interview    Language:  grossly intact, can repeat    Attention Span  : Grossly intact for content of interview   Fund of Knowledge:   intact and appropriate to age and level of education        Assessment and Diagnosis   Status/Progress: Based on the examination today, the patient's problem(s) is/are under fair control.  New problems have not been presented today. Comorbidities are not currently complicating management of the primary condition.      Impression: Pt iss a 33 year old female who met criteria for RENETTA upon initial evaluation with me. She also reports having recently completed taper off Zoloft. Pt has been adamant about monotherapy with Wellbutrin due to reading various sources and forums online. At her last visit one month ago Pt reports she has tolerated Wellbutrin-XL trial well, and has noticed a moderate mood lift. " "She states that she has been reading forums where people have reported better results with SR as opposed to XL. She expressed her desire to be switched from XL to SR at this visit. Since switching from XL to SR formulations, she reports a modest improvement in mood and anxiety levels. She also endorses "times where I just get happy" throughout the day. Fatigue has been problematic for her in the past, but she feels the Wellbutrin has helped with this. Pt states "I was just driving around the other day and told my , wow what a beautiful day. He was like wow, where did that come from?"    Fatigue and concentration continue to be problematic for her at times, and still experiences some "down days" with no identifiable trigger.     She continues to have some generalized worry and catastrophic thinking, however this has reduced in frequency and severity     Sleep is good, appetite is good.     Pt stable on current meds. No panic attacks.  Diagnosis: RENETTA    Intervention/Counseling/Treatment Plan   · Medication Management:      1. Increase Wellbutrin SR to 200mg QD due to conituned sx of decreased mood and anxiety     2. Call to report any worsening of symptoms or problems with the medication. Pt instructed to go to ER with thoughts of harming self, others     3. Patient given contact # for psychotherapists at [facility] and also instructed she may check with insurance for list of providers.      4. Labs: no new orders     Psychotherapy:   · Target symptoms: anxiety  · Why chosen therapy is appropriate versus another modality: relevant to diagnosis, patient responds to this modality  · Outcome monitoring methods: self-report, observation, feedback from family   · Therapeutic intervention type: supportive psychotherapy  · Topics discussed/themes: building skills sets for symptom management, symptom recognition, nutrition, exercise  · The patient's response to the intervention is accepting. The patient's " progress toward treatment goals is positive progress.  · Duration of intervention: 20 minutes     Return to clinic: 4 weeks    -Spent 30min face to face with the pt; >50% time spent in counseling   -Supportive therapy and psychoeducation provided  -R/B/SE's of medications discussed with the pt who expresses understanding and chooses to take medications as prescribed.   -Pt instructed to call clinic, 911 or go to nearest emergency room if sxs worsen or pt is in   crisis. The pt expresses understanding.    Waldemar Michele, NP

## 2019-05-28 RX ORDER — BUPROPION HYDROCHLORIDE 150 MG/1
TABLET ORAL
Qty: 30 TABLET | Refills: 2 | OUTPATIENT
Start: 2019-05-28

## 2019-06-05 ENCOUNTER — OFFICE VISIT (OUTPATIENT)
Dept: PSYCHIATRY | Facility: CLINIC | Age: 34
End: 2019-06-05
Payer: COMMERCIAL

## 2019-06-05 VITALS
DIASTOLIC BLOOD PRESSURE: 73 MMHG | BODY MASS INDEX: 23.61 KG/M2 | WEIGHT: 138.31 LBS | SYSTOLIC BLOOD PRESSURE: 111 MMHG | HEART RATE: 81 BPM | HEIGHT: 64 IN

## 2019-06-05 DIAGNOSIS — F41.1 GAD (GENERALIZED ANXIETY DISORDER): Primary | ICD-10-CM

## 2019-06-05 PROCEDURE — 99999 PR PBB SHADOW E&M-EST. PATIENT-LVL III: ICD-10-PCS | Mod: PBBFAC,,, | Performed by: NURSE PRACTITIONER

## 2019-06-05 PROCEDURE — 90833 PR PSYCHOTHERAPY W/PATIENT W/E&M, 30 MIN (ADD ON): ICD-10-PCS | Mod: S$GLB,,, | Performed by: NURSE PRACTITIONER

## 2019-06-05 PROCEDURE — 99999 PR PBB SHADOW E&M-EST. PATIENT-LVL III: CPT | Mod: PBBFAC,,, | Performed by: NURSE PRACTITIONER

## 2019-06-05 PROCEDURE — 90833 PSYTX W PT W E/M 30 MIN: CPT | Mod: S$GLB,,, | Performed by: NURSE PRACTITIONER

## 2019-06-05 PROCEDURE — 99213 PR OFFICE/OUTPT VISIT, EST, LEVL III, 20-29 MIN: ICD-10-PCS | Mod: S$GLB,,, | Performed by: NURSE PRACTITIONER

## 2019-06-05 PROCEDURE — 99213 OFFICE O/P EST LOW 20 MIN: CPT | Mod: S$GLB,,, | Performed by: NURSE PRACTITIONER

## 2019-06-05 PROCEDURE — 3008F BODY MASS INDEX DOCD: CPT | Mod: CPTII,S$GLB,, | Performed by: NURSE PRACTITIONER

## 2019-06-05 PROCEDURE — 3008F PR BODY MASS INDEX (BMI) DOCUMENTED: ICD-10-PCS | Mod: CPTII,S$GLB,, | Performed by: NURSE PRACTITIONER

## 2019-06-05 NOTE — PROGRESS NOTES
"Outpatient Psychiatry Follow-Up Visit    Clinical Status of Patient: Outpatient (Ambulatory)  06/05/2019     Chief Complaint: Pt is a 33 year old female who presents today for a follow-up. Met with patient.       Interval History and Content of Current Session:  Interim Events/Subjective Report/Content of Current Session: follow up appointment.    Pt is a 33 year old female with past psychiatric hx of RENETTA who presents for follow up treatment. She is currently taking Wellbutrin-SR 200mg QD, after a recent transition from Wellbutrin-XL. Pt has been adamant about monotherapy with Wellbutrin due to reading various sources and forums online. I have educated her that this med can potentially increase anxiety, and is better suited for treating depression. She states that she has been reading forums where people have reported better results with SR as opposed to XL. She then expressed her desire to be switched from XL to SR Since switching from XL to SR formulations, she reports a modest improvement in mood and anxiety levels. She also endorses "times where I just get happy" throughout the day. Fatigue has been problematic for her in the past, but she feels the Wellbutrin has helped with this. Pt states "I was just driving around the other day and told my , wow what a beautiful day. He was like wow, where did that come from?"    Recently stopped drinking coffee, has noticed significant reduction in overall anxiety levels.     Fatigue and concentration continue to be problematic for her at times, and still experiences some "down days" with no identifiable trigger.     She continues to have some generalized worry and catastrophic thinking, however this has reduced in frequency and severity     Sleep is good, appetite is good. Has noticed continued improvement with Wellbutrin. Stable on current meds.      Past Psychiatric hx: : Pt. is a  34 y/o female with a hx of anxiety, presenting to the clinic for an initial " "evaluation and treatment. She has a history of miscarriage in 2017. She came to me taking Zoloft 100mg QD, which was prescribed by her OB-GYN following the birth of her second child on 09/03/2018, due to post-partum depression. She reports that this medication has possibly made her anxiety slightly worse. It has also made her "apathetic and emotionless." She reports that she would like to d/c the Zoloft and try something new. Previous med trials include Lexapro (prescribed at age 17 for anxiety, reports efficacy), Pristiq (was unsure if this worked due to concurrent use with Vyvanse), and Vyvanse.      Pt has long experienced anxiety and characterizes herself as a "worrier". Following a miscarriage in 2017, her constant worry of herself getting cancer has now turned into worry about her children getting cancer. She endorses low motivation, poor energy, poor concentration, irritability, muscle tension.        Past Medical hx:   Past Medical History:   Diagnosis Date    ADD (attention deficit disorder)     Anxiety     Cystic hygroma     Diabetes mellitus     gdm        Interim hx:  Medication changes last visit: Start Wellbutin- mg    Anxiety: 3/10  Depression: 0/10     Denies suicidal/homicidal ideations.  Denies hopelessness/worthlessness.    Denies auditory/visual hallucinations      Alcohol: 4-5 nights weekly, one drink of liquor nightly  Drug: denies  Caffeine: 1 cup coffee daily  Tobacco: denies      Review of Systems   · PSYCHIATRIC: Pertinent items are noted in the narrative.        CONSTITUTIONAL: weight stable        M/S: no pain today         ENT: no allergies noted today        ABD: no n/v/d     Past Medical, Family and Social History: The patient's past medical, family and social history have been reviewed and updated as appropriate within the electronic medical record. See encounter notes.     Medication: Wellbutrin SR 200mg QD     Compliance: yes      Side effects: tolerates     Risk " "Parameters:  Patient reports no suicidal ideation  Patient reports no homicidal ideation  Patient reports no self-injurious behavior  Patient reports no violent behavior     Exam (detailed: at least 9 elements; comprehensive: all 15 elements)   Constitutional  Vitals:  Most recent vital signs, dated less than 90 days prior to this appointment, were reviewed. /73   Pulse 81   Ht 5' 4" (1.626 m)   Wt 62.8 kg (138 lb 5.4 oz)   LMP 05/29/2019   BMI 23.75 kg/m²      General:  unremarkable, age appropriate, casual attire, good eye contact, good rapport       Musculoskeletal  Muscle Strength/Tone:  no flaccidity, no tremor    Gait & Station:  normal      Psychiatric                       Speech:  normal tone, normal rate, rhythm, and volume   Mood & Affect:   Euthymic, congruent, appropriate         Thought Process:   Goal directed; Linear    Associations:   intact   Thought Content:   No SI/HI, delusions, or paranoia, no AV/VH   Insight & Judgement:   Good, adequate to circumstances   Orientation:   grossly intact; alert and oriented x 4    Memory:  intact for content of interview    Language:  grossly intact, can repeat    Attention Span  : Grossly intact for content of interview   Fund of Knowledge:   intact and appropriate to age and level of education        Assessment and Diagnosis   Status/Progress: Based on the examination today, the patient's problem(s) is/are under fair control.  New problems have not been presented today. Comorbidities are not currently complicating management of the primary condition.      Impression: Pt is a 33 year old female with past psychiatric hx of RENETTA who presents for follow up treatment. She is currently taking Wellbutrin-SR 200mg QD, after a recent transition from Wellbutrin-XL. Pt has been adamant about monotherapy with Wellbutrin due to reading various sources and forums online. I have educated her that this med can potentially increase anxiety, and is better suited for " "treating depression. She states that she has been reading forums where people have reported better results with SR as opposed to XL. She then expressed her desire to be switched from XL to SR Since switching from XL to SR formulations, she reports a modest improvement in mood and anxiety levels. She also endorses "times where I just get happy" throughout the day. Fatigue has been problematic for her in the past, but she feels the Wellbutrin has helped with this. Pt states "I was just driving around the other day and told my , wow what a beautiful day. He was like wow, where did that come from?"    Recently stopped drinking coffee, has noticed significant reduction in overall anxiety levels.     Fatigue and concentration continue to be problematic for her at times, and still experiences some "down days" with no identifiable trigger.     She continues to have some generalized worry and catastrophic thinking, however this has reduced in frequency and severity     Sleep is good, appetite is good. Has noticed continued improvement with Wellbutrin. Stable on current meds.      Pt stable on current meds. No panic attacks.  Diagnosis: RENETTA    Intervention/Counseling/Treatment Plan   · Medication Management:      1. Continue Wellbutrin- mg QD.      2. Call to report any worsening of symptoms or problems with the medication. Pt instructed to go to ER with thoughts of harming self, others     3. Patient given contact # for psychotherapists at [facility] and also instructed she may check with insurance for list of providers.      4. Labs: no new orders     Psychotherapy:   · Target symptoms: anxiety  · Why chosen therapy is appropriate versus another modality: relevant to diagnosis, patient responds to this modality  · Outcome monitoring methods: self-report, observation, feedback from family   · Therapeutic intervention type: supportive psychotherapy  · Topics discussed/themes: building skills sets for " symptom management, symptom recognition, nutrition, exercise  · The patient's response to the intervention is accepting. The patient's progress toward treatment goals is positive progress.  · Duration of intervention: 20 minutes     Return to clinic: 4 mo    -Spent 30min face to face with the pt; >50% time spent in counseling   -Supportive therapy and psychoeducation provided  -R/B/SE's of medications discussed with the pt who expresses understanding and chooses to take medications as prescribed.   -Pt instructed to call clinic, 911 or go to nearest emergency room if sxs worsen or pt is in   crisis. The pt expresses understanding.    Waldemar Michele, NP

## 2019-08-08 ENCOUNTER — OFFICE VISIT (OUTPATIENT)
Dept: FAMILY MEDICINE | Facility: CLINIC | Age: 34
End: 2019-08-08
Payer: COMMERCIAL

## 2019-08-08 ENCOUNTER — LAB VISIT (OUTPATIENT)
Dept: LAB | Facility: HOSPITAL | Age: 34
End: 2019-08-08
Attending: FAMILY MEDICINE
Payer: COMMERCIAL

## 2019-08-08 VITALS
BODY MASS INDEX: 23.26 KG/M2 | TEMPERATURE: 98 F | OXYGEN SATURATION: 98 % | DIASTOLIC BLOOD PRESSURE: 72 MMHG | WEIGHT: 136.25 LBS | RESPIRATION RATE: 18 BRPM | SYSTOLIC BLOOD PRESSURE: 124 MMHG | HEIGHT: 64 IN | HEART RATE: 84 BPM

## 2019-08-08 DIAGNOSIS — I49.3 PVC (PREMATURE VENTRICULAR CONTRACTION): Primary | ICD-10-CM

## 2019-08-08 DIAGNOSIS — R00.2 PALPITATION: ICD-10-CM

## 2019-08-08 LAB
ALBUMIN SERPL BCP-MCNC: 3.6 G/DL (ref 3.5–5.2)
ALP SERPL-CCNC: 42 U/L (ref 55–135)
ALT SERPL W/O P-5'-P-CCNC: 19 U/L (ref 10–44)
ANION GAP SERPL CALC-SCNC: 11 MMOL/L (ref 8–16)
AST SERPL-CCNC: 18 U/L (ref 10–40)
BASOPHILS # BLD AUTO: 0.04 K/UL (ref 0–0.2)
BASOPHILS NFR BLD: 0.7 % (ref 0–1.9)
BILIRUB SERPL-MCNC: 0.4 MG/DL (ref 0.1–1)
BUN SERPL-MCNC: 17 MG/DL (ref 6–20)
CALCIUM SERPL-MCNC: 9.6 MG/DL (ref 8.7–10.5)
CHLORIDE SERPL-SCNC: 105 MMOL/L (ref 95–110)
CO2 SERPL-SCNC: 23 MMOL/L (ref 23–29)
CREAT SERPL-MCNC: 0.9 MG/DL (ref 0.5–1.4)
DIFFERENTIAL METHOD: ABNORMAL
EOSINOPHIL # BLD AUTO: 0.1 K/UL (ref 0–0.5)
EOSINOPHIL NFR BLD: 1.9 % (ref 0–8)
ERYTHROCYTE [DISTWIDTH] IN BLOOD BY AUTOMATED COUNT: 13.2 % (ref 11.5–14.5)
EST. GFR  (AFRICAN AMERICAN): >60 ML/MIN/1.73 M^2
EST. GFR  (NON AFRICAN AMERICAN): >60 ML/MIN/1.73 M^2
GLUCOSE SERPL-MCNC: 109 MG/DL (ref 70–110)
HCT VFR BLD AUTO: 40.1 % (ref 37–48.5)
HGB BLD-MCNC: 12.7 G/DL (ref 12–16)
IMM GRANULOCYTES # BLD AUTO: 0.02 K/UL (ref 0–0.04)
IMM GRANULOCYTES NFR BLD AUTO: 0.3 % (ref 0–0.5)
LYMPHOCYTES # BLD AUTO: 2.4 K/UL (ref 1–4.8)
LYMPHOCYTES NFR BLD: 40.4 % (ref 18–48)
MAGNESIUM SERPL-MCNC: 2.1 MG/DL (ref 1.6–2.6)
MCH RBC QN AUTO: 30.6 PG (ref 27–31)
MCHC RBC AUTO-ENTMCNC: 31.7 G/DL (ref 32–36)
MCV RBC AUTO: 97 FL (ref 82–98)
MONOCYTES # BLD AUTO: 0.4 K/UL (ref 0.3–1)
MONOCYTES NFR BLD: 7 % (ref 4–15)
NEUTROPHILS # BLD AUTO: 2.9 K/UL (ref 1.8–7.7)
NEUTROPHILS NFR BLD: 49.7 % (ref 38–73)
NRBC BLD-RTO: 0 /100 WBC
PLATELET # BLD AUTO: 217 K/UL (ref 150–350)
PMV BLD AUTO: 11.8 FL (ref 9.2–12.9)
POTASSIUM SERPL-SCNC: 4.2 MMOL/L (ref 3.5–5.1)
PROT SERPL-MCNC: 7 G/DL (ref 6–8.4)
RBC # BLD AUTO: 4.15 M/UL (ref 4–5.4)
SODIUM SERPL-SCNC: 139 MMOL/L (ref 136–145)
TSH SERPL DL<=0.005 MIU/L-ACNC: 1.11 UIU/ML (ref 0.4–4)
WBC # BLD AUTO: 5.89 K/UL (ref 3.9–12.7)

## 2019-08-08 PROCEDURE — 36415 COLL VENOUS BLD VENIPUNCTURE: CPT | Mod: PO

## 2019-08-08 PROCEDURE — 93005 EKG 12-LEAD: ICD-10-PCS | Mod: S$GLB,,, | Performed by: FAMILY MEDICINE

## 2019-08-08 PROCEDURE — 99999 PR PBB SHADOW E&M-EST. PATIENT-LVL III: ICD-10-PCS | Mod: PBBFAC,,, | Performed by: FAMILY MEDICINE

## 2019-08-08 PROCEDURE — 93010 ELECTROCARDIOGRAM REPORT: CPT | Mod: S$GLB,,, | Performed by: INTERNAL MEDICINE

## 2019-08-08 PROCEDURE — 99213 PR OFFICE/OUTPT VISIT, EST, LEVL III, 20-29 MIN: ICD-10-PCS | Mod: S$GLB,,, | Performed by: FAMILY MEDICINE

## 2019-08-08 PROCEDURE — 83735 ASSAY OF MAGNESIUM: CPT

## 2019-08-08 PROCEDURE — 3008F BODY MASS INDEX DOCD: CPT | Mod: CPTII,S$GLB,, | Performed by: FAMILY MEDICINE

## 2019-08-08 PROCEDURE — 99999 PR PBB SHADOW E&M-EST. PATIENT-LVL III: CPT | Mod: PBBFAC,,, | Performed by: FAMILY MEDICINE

## 2019-08-08 PROCEDURE — 93005 ELECTROCARDIOGRAM TRACING: CPT | Mod: S$GLB,,, | Performed by: FAMILY MEDICINE

## 2019-08-08 PROCEDURE — 93010 EKG 12-LEAD: ICD-10-PCS | Mod: S$GLB,,, | Performed by: INTERNAL MEDICINE

## 2019-08-08 PROCEDURE — 99213 OFFICE O/P EST LOW 20 MIN: CPT | Mod: S$GLB,,, | Performed by: FAMILY MEDICINE

## 2019-08-08 PROCEDURE — 84443 ASSAY THYROID STIM HORMONE: CPT

## 2019-08-08 PROCEDURE — 85025 COMPLETE CBC W/AUTO DIFF WBC: CPT

## 2019-08-08 PROCEDURE — 80053 COMPREHEN METABOLIC PANEL: CPT

## 2019-08-08 PROCEDURE — 3008F PR BODY MASS INDEX (BMI) DOCUMENTED: ICD-10-PCS | Mod: CPTII,S$GLB,, | Performed by: FAMILY MEDICINE

## 2019-08-08 RX ORDER — METOPROLOL TARTRATE 25 MG/1
25 TABLET, FILM COATED ORAL 2 TIMES DAILY PRN
Qty: 30 TABLET | Refills: 1 | Status: SHIPPED | OUTPATIENT
Start: 2019-08-08 | End: 2021-02-26 | Stop reason: SDUPTHER

## 2019-08-08 NOTE — PROGRESS NOTES
Subjective:       Patient ID: Donna Nguyen is a 33 y.o. female.    Chief Complaint: Heart Problem (since , it feels bubbling or flutter in the middle of chest)    C/o some intermittent fluttering over the last 5 days.  Worse yesterday, but improved now today.  She has felt this sporatically in the past.      Past Medical History:   Diagnosis Date    ADD (attention deficit disorder)     Anxiety     Cystic hygroma     Diabetes mellitus     gdm       Past Surgical History:   Procedure Laterality Date     SECTION  2015     SECTION N/A 9/3/2018    Performed by [facility] at [facility] L[facility]D    D & C (SUCTION) N/A 2017    Performed by [facility] at [facility] OR    DILATION AND CURETTAGE OF UTERUS         Review of patient's allergies indicates:   Allergen Reactions    No known drug allergies        Social History     Socioeconomic History    Marital status:      Spouse name: Not on file    Number of children: Not on file    Years of education: Not on file    Highest education level: Not on file   Occupational History    Occupation: home   Social Needs    Financial resource strain: Not on file    Food insecurity:     Worry: Not on file     Inability: Not on file    Transportation needs:     Medical: Not on file     Non-medical: Not on file   Tobacco Use    Smoking status: Former Smoker    Smokeless tobacco: Never Used   Substance and Sexual Activity    Alcohol use: Yes     Alcohol/week: 3.0 oz     Types: 6 Standard drinks or equivalent per week     Comment: socially    Drug use: No    Sexual activity: Yes     Partners: Male   Lifestyle    Physical activity:     Days per week: Not on file     Minutes per session: Not on file    Stress: Not on file   Relationships    Social connections:     Talks on phone: Not on file     Gets together: Not on file     Attends [employer] service: Not on file     Active member of club or organization: Not on file     Attends  meetings of clubs or organizations: Not on file     Relationship status: Not on file   Other Topics Concern    Not on file   Social History Narrative    Not on file       Current Outpatient Medications on File Prior to Visit   Medication Sig Dispense Refill    buPROPion (WELLBUTRIN SR) 200 MG SR12 Take 1 tablet (200 mg total) by mouth once daily. 30 tablet 3    SRONYX 0.1-20 mg-mcg per tablet        No current facility-administered medications on file prior to visit.        Family History   Problem Relation Age of Onset    Miscarriages / Stillbirths Mother     Congenital heart disease Cousin         wpw surgery @ 3    Arrhythmia Cousin         wpw, surgery @ 3     Cancer Maternal Grandmother     Heart disease Paternal Grandfather     Breast cancer Neg Hx     Colon cancer Neg Hx     Ovarian cancer Neg Hx     Diabetes Neg Hx     Eclampsia Neg Hx     Hypertension Neg Hx     Stroke Neg Hx      labor Neg Hx     Cardiomyopathy Neg Hx     Pacemaker/defibrilator Neg Hx     Heart attacks under age 50 Neg Hx        Review of Systems   Constitutional: Negative for appetite change, chills, fever and unexpected weight change.   HENT: Negative for sore throat and trouble swallowing.    Eyes: Negative for pain and visual disturbance.   Respiratory: Negative for cough, shortness of breath and wheezing.    Cardiovascular: Positive for palpitations. Negative for chest pain.   Gastrointestinal: Negative for abdominal pain, blood in stool, diarrhea, nausea and vomiting.   Genitourinary: Negative for difficulty urinating, dysuria and hematuria.   Musculoskeletal: Negative for arthralgias, gait problem and neck pain.   Skin: Negative for rash and wound.   Neurological: Negative for dizziness, weakness, numbness and headaches.   Hematological: Negative for adenopathy.   Psychiatric/Behavioral: Negative for dysphoric mood.       Objective:      /72 (BP Location: Left arm, Patient Position: Sitting)   Pulse  "84   Temp 98.4 °F (36.9 °C)   Resp 18   Ht 5' 4" (1.626 m)   Wt 61.8 kg (136 lb 3.9 oz)   LMP 07/30/2019 (Approximate)   SpO2 98%   BMI 23.39 kg/m²   Physical Exam   Constitutional: She is oriented to person, place, and time. She appears well-developed and well-nourished.   HENT:   Head: Normocephalic.   Mouth/Throat: Oropharynx is clear and moist. No oropharyngeal exudate or posterior oropharyngeal erythema.   Eyes: Pupils are equal, round, and reactive to light. Conjunctivae and EOM are normal.   Neck: Normal range of motion. Neck supple. No thyromegaly present.   Cardiovascular: Normal rate, regular rhythm, S1 normal, S2 normal, normal heart sounds and intact distal pulses. Exam reveals no gallop and no friction rub.   No murmur heard.  Pulmonary/Chest: Effort normal and breath sounds normal. She has no wheezes. She has no rales.   Abdominal: Normal appearance.   Musculoskeletal:        Right lower leg: She exhibits no edema.        Left lower leg: She exhibits no edema.   Lymphadenopathy:     She has no cervical adenopathy.   Neurological: She is alert and oriented to person, place, and time. No cranial nerve deficit. Gait normal.   Skin: Skin is warm and intact. No rash noted.   Psychiatric: She has a normal mood and affect.       EKG: single PVC, otherwise normal    Assessment:       1. PVC (premature ventricular contraction)    2. Palpitation        Plan:       PVC (premature ventricular contraction)    Palpitation  -     IN OFFICE EKG 12-LEAD (to Muse)  -     Comprehensive metabolic panel; Future; Expected date: 08/08/2019  -     CBC auto differential; Future; Expected date: 08/08/2019  -     TSH; Future; Expected date: 08/08/2019  -     Magnesium; Future; Expected date: 08/08/2019  -     metoprolol tartrate (LOPRESSOR) 25 MG tablet; Take 1 tablet (25 mg total) by mouth 2 (two) times daily as needed (palpitations).  Dispense: 30 tablet; Refill: 1      reassuance  Basic handout on PVCs  Lopressor to " use PRN  Labs soon  Counseled on regular exercise, maintenance of a healthy weight, balanced diet rich in fruits/vegetables and lean protein, and avoidance of unhealthy habits like smoking and excessive alcohol intake.

## 2019-09-03 RX ORDER — BUPROPION HYDROCHLORIDE 200 MG/1
TABLET, EXTENDED RELEASE ORAL
Qty: 30 TABLET | Refills: 3 | Status: SHIPPED | OUTPATIENT
Start: 2019-09-03 | End: 2019-12-26

## 2019-09-16 ENCOUNTER — PATIENT OUTREACH (OUTPATIENT)
Dept: ADMINISTRATIVE | Facility: HOSPITAL | Age: 34
End: 2019-09-16

## 2019-09-16 NOTE — LETTER
AUTHORIZATION FOR RELEASE OF   CONFIDENTIAL INFORMATION    Dear Manuel Trevino MD    We are seeing Donna Nguyen, date of birth 1985, in the clinic at [facility]. Manuel Nassar MD is the patient's PCP. Donna Nguyen has an outstanding lab/procedure at the time we reviewed her chart. In order to help keep her health information updated, she has authorized us to request the following medical record(s):        PAP SMEAR                                               Please fax records to [facility], Kevin C. Plaisance, MD,  [phone]     If you have any questions, please contact Garima Bowen, Care Coordinator   at [phone].            Patient Name: Donna Nguyen  : 1985  Patient Phone #: [phone]

## 2019-10-04 ENCOUNTER — TELEPHONE (OUTPATIENT)
Dept: ADMINISTRATIVE | Facility: HOSPITAL | Age: 34
End: 2019-10-04

## 2019-10-04 ENCOUNTER — PATIENT OUTREACH (OUTPATIENT)
Dept: ADMINISTRATIVE | Facility: HOSPITAL | Age: 34
End: 2019-10-04

## 2019-10-09 ENCOUNTER — OFFICE VISIT (OUTPATIENT)
Dept: PSYCHIATRY | Facility: CLINIC | Age: 34
End: 2019-10-09
Payer: COMMERCIAL

## 2019-10-09 VITALS
SYSTOLIC BLOOD PRESSURE: 96 MMHG | BODY MASS INDEX: 23.31 KG/M2 | HEIGHT: 64 IN | DIASTOLIC BLOOD PRESSURE: 64 MMHG | WEIGHT: 136.56 LBS | HEART RATE: 69 BPM

## 2019-10-09 DIAGNOSIS — F41.1 GAD (GENERALIZED ANXIETY DISORDER): Primary | ICD-10-CM

## 2019-10-09 PROCEDURE — 90833 PR PSYCHOTHERAPY W/PATIENT W/E&M, 30 MIN (ADD ON): ICD-10-PCS | Mod: S$GLB,,, | Performed by: NURSE PRACTITIONER

## 2019-10-09 PROCEDURE — 3008F PR BODY MASS INDEX (BMI) DOCUMENTED: ICD-10-PCS | Mod: CPTII,S$GLB,, | Performed by: NURSE PRACTITIONER

## 2019-10-09 PROCEDURE — 99999 PR PBB SHADOW E&M-EST. PATIENT-LVL III: CPT | Mod: PBBFAC,,, | Performed by: NURSE PRACTITIONER

## 2019-10-09 PROCEDURE — 99213 PR OFFICE/OUTPT VISIT, EST, LEVL III, 20-29 MIN: ICD-10-PCS | Mod: S$GLB,,, | Performed by: NURSE PRACTITIONER

## 2019-10-09 PROCEDURE — 90833 PSYTX W PT W E/M 30 MIN: CPT | Mod: S$GLB,,, | Performed by: NURSE PRACTITIONER

## 2019-10-09 PROCEDURE — 99999 PR PBB SHADOW E&M-EST. PATIENT-LVL III: ICD-10-PCS | Mod: PBBFAC,,, | Performed by: NURSE PRACTITIONER

## 2019-10-09 PROCEDURE — 99213 OFFICE O/P EST LOW 20 MIN: CPT | Mod: S$GLB,,, | Performed by: NURSE PRACTITIONER

## 2019-10-09 PROCEDURE — 3008F BODY MASS INDEX DOCD: CPT | Mod: CPTII,S$GLB,, | Performed by: NURSE PRACTITIONER

## 2019-10-09 RX ORDER — FLUOXETINE HYDROCHLORIDE 20 MG/1
20 CAPSULE ORAL DAILY
Qty: 30 CAPSULE | Refills: 0 | Status: SHIPPED | OUTPATIENT
Start: 2019-10-09 | End: 2019-11-06 | Stop reason: SDUPTHER

## 2019-10-09 NOTE — PROGRESS NOTES
"Outpatient Psychiatry Follow-Up Visit    Clinical Status of Patient: Outpatient (Ambulatory)  10/09/2019     Chief Complaint: Pt is a 33 year old female who presents today for a follow-up. Met with patient.       Interval History and Content of Current Session:  Interim Events/Subjective Report/Content of Current Session: follow up appointment.    Pt is a 33 year old female with past psychiatric hx of RENETTA who presents for follow up treatment. She is currently taking Wellbutrin-SR 200mg QD which is reported as therapeutic and well-tolerated. Reports sleeping around 8 hours each night, no real issues falling or staying asleep. Energy and concentration good throughout the day. Appetite good.  Today, pt reports an increase in anxiety levels. Generalized and ruminative worrying increased - sometimes feels restless. States she would like to resume treatment sith SSRI due to past efficacy.      Past Psychiatric hx: : Pt. is a  32 y/o female with a hx of anxiety, presenting to the clinic for an initial evaluation and treatment. She has a history of miscarriage in 2017. She came to me taking Zoloft 100mg QD, which was prescribed by her OB-GYN following the birth of her second child on 09/03/2018, due to post-partum depression. She reports that this medication has possibly made her anxiety slightly worse. It has also made her "apathetic and emotionless." She reports that she would like to d/c the Zoloft and try something new. Previous med trials include Lexapro (prescribed at age 17 for anxiety, reports efficacy), Pristiq (was unsure if this worked due to concurrent use with Vyvanse), and Vyvanse.      She is currently taking Wellbutrin-SR 200mg QD, after a recent transition from Wellbutrin-XL. Pt has been adamant about monotherapy with Wellbutrin due to reading various sources and forums online. I have educated her that this med can potentially increase anxiety, and is better suited for treating depression. She states that " "she has been reading forums where people have reported better results with SR as opposed to XL. She then expressed her desire to be switched from XL to SR Since switching from XL to SR formulations, she reports a modest improvement in mood and anxiety levels. She also endorses "times where I just get happy" throughout the day. Fatigue has been problematic for her in the past, but she feels the Wellbutrin has helped with this. Pt states "I was just driving around the other day and told my , wow what a beautiful day. He was like wow, where did that come from?"    Pt has long experienced anxiety and characterizes herself as a "worrier". Following a miscarriage in 2017, her constant worry of herself getting cancer has now turned into worry about her children getting cancer. She endorses low motivation, poor energy, poor concentration, irritability, muscle tension.        Past Medical hx:   Past Medical History:   Diagnosis Date    ADD (attention deficit disorder)     Anxiety     Cystic hygroma     Diabetes mellitus     gdm        Interim hx:  Medication changes last visit: none  Anxiety: inc;d  Depression: denies     Denies suicidal/homicidal ideations.  Denies hopelessness/worthlessness.    Denies auditory/visual hallucinations      Alcohol: 4-5 nights weekly, one drink of liquor nightly  Drug: denies  Caffeine: 1 cup coffee daily  Tobacco: denies      Review of Systems   · PSYCHIATRIC: Pertinent items are noted in the narrative.        CONSTITUTIONAL: weight stable        M/S: no pain today         ENT: no allergies noted today        ABD: no n/v/d     Past Medical, Family and Social History: The patient's past medical, family and social history have been reviewed and updated as appropriate within the electronic medical record. See encounter notes.     Medication: Wellbutrin SR 200mg QD     Compliance: yes      Side effects: tolerates     Risk Parameters:  Patient reports no suicidal ideation  Patient " "reports no homicidal ideation  Patient reports no self-injurious behavior  Patient reports no violent behavior     Exam (detailed: at least 9 elements; comprehensive: all 15 elements)   Constitutional  Vitals:  Most recent vital signs, dated less than 90 days prior to this appointment, were reviewed. BP 96/64 (BP Location: Left arm, Patient Position: Sitting)   Pulse 69   Ht 5' 4" (1.626 m)   Wt 62 kg (136 lb 9.2 oz)   LMP 09/25/2019 (Approximate)   BMI 23.44 kg/m²      General:  unremarkable, age appropriate, casual attire, good eye contact, good rapport       Musculoskeletal  Muscle Strength/Tone:  no flaccidity, no tremor    Gait & Station:  normal      Psychiatric                       Speech:  normal tone, normal rate, rhythm, and volume   Mood & Affect:   Euthymic, congruent, appropriate         Thought Process:   Goal directed; Linear    Associations:   intact   Thought Content:   No SI/HI, delusions, or paranoia, no AV/VH   Insight & Judgement:   Good, adequate to circumstances   Orientation:   grossly intact; alert and oriented x 4    Memory:  intact for content of interview    Language:  grossly intact, can repeat    Attention Span  : Grossly intact for content of interview   Fund of Knowledge:   intact and appropriate to age and level of education        Assessment and Diagnosis   Status/Progress: Based on the examination today, the patient's problem(s) is/are under fair control.  New problems have not been presented today. Comorbidities are not currently complicating management of the primary condition.      Impression: Pt is a 33 year old female with past psychiatric hx of RENETTA who presents for follow up treatment. She is currently taking Wellbutrin-SR 200mg QD which is reported as therapeutic and well-tolerated. Reports sleeping around 8 hours each night, no real issues falling or staying asleep. Energy and concentration good throughout the day. Appetite good.  Today, pt reports an increase in " anxiety levels. Generalized and ruminative worrying increased - sometimes feels restless. States she would like to resume treatment sith SSRI due to past efficacy.                  Diagnosis: RENETTA    Intervention/Counseling/Treatment Plan   · Medication Management:      1. Continue Wellbutrin- mg QD.     2. Start Fluoxetine 20 mg QD for anxiety.  Discussed potential for GI side effects, sexual dysfunction, mood destabilization, headaches     3. Call to report any worsening of symptoms or problems with the medication. Pt instructed to go to ER with thoughts of harming self, others     4. Patient given contact # for psychotherapists at [facility] and also instructed she may check with insurance for list of providers.      5. Labs: no new orders     Psychotherapy:   · Target symptoms: generalized and ruminative worrying  · Why chosen therapy is appropriate versus another modality: relevant to diagnosis, patient responds to this modality  · Outcome monitoring methods: self-report, observation, feedback from family   · Therapeutic intervention type: supportive psychotherapy  · Topics discussed/themes: building skills sets for symptom management, symptom recognition, nutrition, exercise  · The patient's response to the intervention is accepting. The patient's progress toward treatment goals is positive progress.  · Duration of intervention: 20 minutes     Return to clinic: 4 wks    -Spent 30min face to face with the pt; >50% time spent in counseling   -Supportive therapy and psychoeducation provided  -R/B/SE's of medications discussed with the pt who expresses understanding and chooses to take medications as prescribed.   -Pt instructed to call clinic, 911 or go to nearest emergency room if sxs worsen or pt is in   crisis. The pt expresses understanding.    Waldemar Michele, NP

## 2019-11-01 RX ORDER — FLUOXETINE HYDROCHLORIDE 20 MG/1
CAPSULE ORAL
Qty: 30 CAPSULE | Refills: 0 | OUTPATIENT
Start: 2019-11-01

## 2019-11-06 ENCOUNTER — OFFICE VISIT (OUTPATIENT)
Dept: PSYCHIATRY | Facility: CLINIC | Age: 34
End: 2019-11-06
Payer: COMMERCIAL

## 2019-11-06 VITALS
WEIGHT: 136.88 LBS | SYSTOLIC BLOOD PRESSURE: 113 MMHG | RESPIRATION RATE: 16 BRPM | HEIGHT: 64 IN | HEART RATE: 83 BPM | DIASTOLIC BLOOD PRESSURE: 78 MMHG | BODY MASS INDEX: 23.37 KG/M2

## 2019-11-06 DIAGNOSIS — F39 UNSPECIFIED MOOD (AFFECTIVE) DISORDER: ICD-10-CM

## 2019-11-06 DIAGNOSIS — F41.1 GAD (GENERALIZED ANXIETY DISORDER): Primary | ICD-10-CM

## 2019-11-06 PROCEDURE — 99999 PR PBB SHADOW E&M-EST. PATIENT-LVL III: CPT | Mod: PBBFAC,,, | Performed by: NURSE PRACTITIONER

## 2019-11-06 PROCEDURE — 90833 PSYTX W PT W E/M 30 MIN: CPT | Mod: S$GLB,,, | Performed by: NURSE PRACTITIONER

## 2019-11-06 PROCEDURE — 3008F PR BODY MASS INDEX (BMI) DOCUMENTED: ICD-10-PCS | Mod: CPTII,S$GLB,, | Performed by: NURSE PRACTITIONER

## 2019-11-06 PROCEDURE — 99214 PR OFFICE/OUTPT VISIT, EST, LEVL IV, 30-39 MIN: ICD-10-PCS | Mod: S$GLB,,, | Performed by: NURSE PRACTITIONER

## 2019-11-06 PROCEDURE — 99214 OFFICE O/P EST MOD 30 MIN: CPT | Mod: S$GLB,,, | Performed by: NURSE PRACTITIONER

## 2019-11-06 PROCEDURE — 3008F BODY MASS INDEX DOCD: CPT | Mod: CPTII,S$GLB,, | Performed by: NURSE PRACTITIONER

## 2019-11-06 PROCEDURE — 99999 PR PBB SHADOW E&M-EST. PATIENT-LVL III: ICD-10-PCS | Mod: PBBFAC,,, | Performed by: NURSE PRACTITIONER

## 2019-11-06 PROCEDURE — 90833 PR PSYCHOTHERAPY W/PATIENT W/E&M, 30 MIN (ADD ON): ICD-10-PCS | Mod: S$GLB,,, | Performed by: NURSE PRACTITIONER

## 2019-11-06 RX ORDER — FLUOXETINE HYDROCHLORIDE 20 MG/1
20 CAPSULE ORAL DAILY
Qty: 30 CAPSULE | Refills: 4 | Status: SHIPPED | OUTPATIENT
Start: 2019-11-06 | End: 2020-03-04

## 2019-11-06 NOTE — PROGRESS NOTES
"Outpatient Psychiatry Follow-Up Visit    Clinical Status of Patient: Outpatient (Ambulatory)  11/06/2019     Chief Complaint: Pt is a 33 year old female who presents today for a follow-up. Met with patient.       Interval History and Content of Current Session:  Interim Events/Subjective Report/Content of Current Session: follow up appointment.    Pt is a 33 year old female with past psychiatric hx of RENETTA who presents for follow up treatment. She is currently taking Wellbutrin-SR 200mg QD and Prozac 20mg QD (started during her last visit one month ago). During her last visit pt reproted an increase in anxiety levels. Generalized and ruminative worrying increased - sometimes feels restless. States she would like to resume treatment sith SSRI due to past efficacy in treating anxiety.    Today, pt reports continued generalized, ruminative worrying, however states "I think I can notice a little bit of positive change. I don't have as much worrying as I used to. I don't feel as blue". Also notes a decrease in irritability. "I don't yell at my  and kids as much as I used to". Energy, concentration improving. Sleep and appetite good.     Notes periodically feeling "kind of down, blue, like how you feel the day after you drink a lot". These episodes do not meet criteria for MDD, and usually last only a few days. Does note a hx of possible depressive episodes, but feels this have gradually decreased with age.     Past Psychiatric hx: : Pt. is a  34 y/o female with a hx of anxiety, presenting to the clinic for an initial evaluation and treatment. She has a history of miscarriage in 2017. She came to me taking Zoloft 100mg QD, which was prescribed by her OB-GYN following the birth of her second child on 09/03/2018, due to post-partum depression. She reports that this medication has possibly made her anxiety slightly worse. It has also made her "apathetic and emotionless." She reports that she would like to d/c the " "Zoloft and try something new. Previous med trials include Lexapro (prescribed at age 17 for anxiety, reports efficacy), Pristiq (was unsure if this worked due to concurrent use with Vyvanse), and Vyvanse.      She is currently taking Wellbutrin-SR 200mg QD, after a recent transition from Wellbutrin-XL. Pt has been adamant about monotherapy with Wellbutrin due to reading various sources and forums online. I have educated her that this med can potentially increase anxiety, and is better suited for treating depression. She states that she has been reading forums where people have reported better results with SR as opposed to XL. She then expressed her desire to be switched from XL to SR Since switching from XL to SR formulations, she reports a modest improvement in mood and anxiety levels. She also endorses "times where I just get happy" throughout the day. Fatigue has been problematic for her in the past, but she feels the Wellbutrin has helped with this. Pt states "I was just driving around the other day and told my , wow what a beautiful day. He was like wow, where did that come from?"    Pt has long experienced anxiety and characterizes herself as a "worrier". Following a miscarriage in 2017, her constant worry of herself getting cancer has now turned into worry about her children getting cancer. She endorses low motivation, poor energy, poor concentration, irritability, muscle tension.        Past Medical hx:   Past Medical History:   Diagnosis Date    ADD (attention deficit disorder)     Anxiety     Cystic hygroma     Diabetes mellitus     gdm        Interim hx:  Medication changes last visit: start prozac 20mg QD  Anxiety: inc;d  Depression: denies     Denies suicidal/homicidal ideations.  Denies hopelessness/worthlessness.    Denies auditory/visual hallucinations      Alcohol: 4-5 nights weekly, one drink of liquor nightly  Drug: denies  Caffeine: 1 cup coffee daily  Tobacco: denies      Review of " "Systems   PSYCHIATRIC: Pertinent items are noted in the narrative.        CONSTITUTIONAL: weight stable        M/S: no pain today         ENT: no allergies noted today        ABD: no n/v/d     Past Medical, Family and Social History: The patient's past medical, family and social history have been reviewed and updated as appropriate within the electronic medical record. See encounter notes.     Medication: Wellbutrin SR 200mg QD, prozac 20mg QD     Compliance: yes      Side effects: tolerates     Risk Parameters:  Patient reports no suicidal ideation  Patient reports no homicidal ideation  Patient reports no self-injurious behavior  Patient reports no violent behavior     Exam (detailed: at least 9 elements; comprehensive: all 15 elements)   Constitutional  Vitals:  Most recent vital signs, dated less than 90 days prior to this appointment, were reviewed. /78 (BP Location: Left arm, Patient Position: Sitting)   Pulse 83   Resp 16   Ht 5' 4" (1.626 m)   Wt 62.1 kg (136 lb 14.5 oz)   LMP 10/16/2019 (Exact Date)   BMI 23.50 kg/m²      General:  unremarkable, age appropriate, casual attire, good eye contact, good rapport       Musculoskeletal  Muscle Strength/Tone:  no flaccidity, no tremor    Gait & Station:  normal      Psychiatric                       Speech:  normal tone, normal rate, rhythm, and volume   Mood & Affect:   Euthymic, congruent, appropriate         Thought Process:   Goal directed; Linear    Associations:   intact   Thought Content:   No SI/HI, delusions, or paranoia, no AV/VH   Insight & Judgement:   Good, adequate to circumstances   Orientation:   grossly intact; alert and oriented x 4    Memory:  intact for content of interview    Language:  grossly intact, can repeat    Attention Span  : Grossly intact for content of interview   Fund of Knowledge:   intact and appropriate to age and level of education        Assessment and Diagnosis   Status/Progress: Based on the examination today, the " "patient's problem(s) is/are under fair control.  New problems have not been presented today. Comorbidities are not currently complicating management of the primary condition.      Impression: Pt is a 33 year old female with past psychiatric hx of RENETTA who presents for follow up treatment. She is currently taking Wellbutrin-SR 200mg QD and Prozac 20mg QD (started during her last visit one month ago). During her last visit pt reproted an increase in anxiety levels. Generalized and ruminative worrying increased - sometimes feels restless. States she would like to resume treatment sith SSRI due to past efficacy in treating anxiety.    Today, pt reports continued generalized, ruminative worrying, however states "I think I can notice a little bit of positive change. I don't have as much worrying as I used to. I don't feel as blue". Also notes a decrease in irritability. "I don't yell at my  and kids as much as I used to". Energy, concentration improving. Sleep and appetite good.     Notes periodically feeling "kind of down, blue, like how you feel the day after you drink a lot". These episodes do not meet criteria for MDD, and usually last only a few days. Does note a hx of possible depressive episodes, but feels this have gradually decreased with age.      Diagnosis: RENETTA, mood disorder, unspecified    Intervention/Counseling/Treatment Plan   · Medication Management:      1. Continue Wellbutrin- mg QD.     2. Continue Fluoxetine 20 mg QD for anxiety.  Discussed potential for GI side effects, sexual dysfunction, mood destabilization, headaches     3. Call to report any worsening of symptoms or problems with the medication. Pt instructed to go to ER with thoughts of harming self, others     4. Patient given contact # for psychotherapists at [facility] and also instructed she may check with insurance for list of providers.      5. Labs: no new orders     Psychotherapy:   · Target symptoms: generalized and " ruminative worrying  · Why chosen therapy is appropriate versus another modality: relevant to diagnosis, patient responds to this modality  · Outcome monitoring methods: self-report, observation, feedback from family   · Therapeutic intervention type: supportive psychotherapy  · Topics discussed/themes: building skills sets for symptom management, symptom recognition, nutrition, exercise  · The patient's response to the intervention is accepting. The patient's progress toward treatment goals is positive progress.  · Duration of intervention: 20 minutes     Return to clinic: 4 mo    -Spent 30min face to face with the pt; >50% time spent in counseling   -Supportive therapy and psychoeducation provided  -R/B/SE's of medications discussed with the pt who expresses understanding and chooses to take medications as prescribed.   -Pt instructed to call clinic, 911 or go to nearest emergency room if sxs worsen or pt is in   crisis. The pt expresses understanding.    Waldemar Michele, NP

## 2019-12-26 RX ORDER — BUPROPION HYDROCHLORIDE 200 MG/1
TABLET, EXTENDED RELEASE ORAL
Qty: 30 TABLET | Refills: 3 | Status: SHIPPED | OUTPATIENT
Start: 2019-12-26 | End: 2020-04-27

## 2020-03-03 NOTE — PROGRESS NOTES
"Outpatient Psychiatry Follow-Up Visit    Clinical Status of Patient: Outpatient (Ambulatory)  03/03/2020     Chief Complaint: Pt is a 34 year old female who presents today for a follow-up. Met with patient.       Interval History and Content of Current Session:  Interim Events/Subjective Report/Content of Current Session: follow up appointment.    Pt is a 34 year old female with a hx of RENETTA, mood disorder NOS who presents for follow up treatment. She is currently taking Wellbutrin-SR 200mg QD and Prozac 20mg QD. Current med regimen tolerated well. Today, pt reports that she stopped taking Prozac due to weight gain. She does report a return in some anxiety, low mood, and irritability since d/c. She is adamant about increasing Wellbutrin SR rather than trial of an alternate SSRI. I have educated her on the risk of increaed anxiety, irritability with this increase. Sleeping well overall, no issues falling or staying asleep.        Past Psychiatric hx: : Pt. is a 33 y/o female with a hx of RENETTA, mood disorder NOS who established care with me 2/19.. She has a history of miscarriage in 2017. She came to me taking Zoloft 100mg QD, which was prescribed by her OB-GYN following the birth of her second child on 09/03/2018, due to post-partum depression. She reports that this medication has possibly made her anxiety slightly worse. It has also made her "apathetic and emotionless." She reports that she would like to d/c the Zoloft and try something new. Previous med trials include Lexapro (prescribed at age 17 for anxiety, reports efficacy), Pristiq (was unsure if this worked due to concurrent use with Vyvanse), and Vyvanse.      She is currently taking Wellbutrin-SR 200mg QD, after a recent transition from Wellbutrin-XL. Pt has been adamant about monotherapy with Wellbutrin due to reading various sources and forums online. I have educated her that this med can potentially increase anxiety, and is better suited for treating " "depression. She states that she has been reading forums where people have reported better results with SR as opposed to XL. She then expressed her desire to be switched from XL to SR Since switching from XL to SR formulations, she reports a modest improvement in mood and anxiety levels. She also endorses "times where I just get happy" throughout the day. Fatigue has been problematic for her in the past, but she feels the Wellbutrin has helped with this. Pt states "I was just driving around the other day and told my , wow what a beautiful day. He was like wow, where did that come from?"    Pt has long experienced anxiety and characterizes herself as a "worrier". Following a miscarriage in 2017, her constant worry of herself getting cancer has now turned into worry about her children getting cancer. She endorses low motivation, poor energy, poor concentration, irritability, muscle tension.        Past Medical hx:   Past Medical History:   Diagnosis Date    ADD (attention deficit disorder)     Anxiety     Cystic hygroma     Diabetes mellitus     gdm        Interim hx:  Medication changes last visit: none  Anxiety: inc;d  Depression: denies     Denies suicidal/homicidal ideations.  Denies hopelessness/worthlessness.    Denies auditory/visual hallucinations      Alcohol: 4-5 nights weekly, one drink of liquor nightly  Drug: denies  Caffeine: 1 cup coffee daily  Tobacco: denies      Review of Systems   PSYCHIATRIC: Pertinent items are noted in the narrative.        CONSTITUTIONAL: weight stable        M/S: no pain today         ENT: no allergies noted today        ABD: no n/v/d     Past Medical, Family and Social History: The patient's past medical, family and social history have been reviewed and updated as appropriate within the electronic medical record. See encounter notes.     Medication: Wellbutrin SR 200mg QD, prozac 20mg QD     Compliance: yes      Side effects: tolerates     Risk Parameters:  Patient " "reports no suicidal ideation  Patient reports no homicidal ideation  Patient reports no self-injurious behavior  Patient reports no violent behavior     Exam (detailed: at least 9 elements; comprehensive: all 15 elements)   Constitutional  Vitals:  Most recent vital signs, dated less than 90 days prior to this appointment, were reviewed. /75 (BP Location: Left arm, Patient Position: Sitting)   Pulse 72   Resp 16   Ht 5' 4" (1.626 m)   Wt 65.2 kg (143 lb 11.8 oz)   LMP 02/05/2020 (Approximate)   BMI 24.67 kg/m²      General:  unremarkable, age appropriate, casual attire, good eye contact, good rapport       Musculoskeletal  Muscle Strength/Tone:  no flaccidity, no tremor    Gait & Station:  normal      Psychiatric                       Speech:  normal tone, normal rate, rhythm, and volume   Mood & Affect:   Euthymic, congruent, appropriate         Thought Process:   Goal directed; Linear    Associations:   intact   Thought Content:   No SI/HI, delusions, or paranoia, no AV/VH   Insight & Judgement:   Good, adequate to circumstances   Orientation:   grossly intact; alert and oriented x 4    Memory:  intact for content of interview    Language:  grossly intact, can repeat    Attention Span  : Grossly intact for content of interview   Fund of Knowledge:   intact and appropriate to age and level of education        Assessment and Diagnosis   Status/Progress: Based on the examination today, the patient's problem(s) is/are under fair control.  New problems have not been presented today. Comorbidities are not currently complicating management of the primary condition.      Impression: Pt is a 34 year old female with a hx of RENETTA, mood disorder NOS who presents for follow up treatment. She is currently taking Wellbutrin-SR 200mg QD and Prozac 20mg QD. Current med regimen tolerated well. Today, pt reports that she stopped taking Prozac due to weight gain. She does report a return in some anxiety, low mood, and " irritability since d/c. She is adamant about increasing Wellbutrin SR rather than trial of an alternate SSRI. I have educated her on the risk of increaed anxiety, irritability with this increase. Sleeping well overall, no issues falling or staying asleep.          Diagnosis: RENETTA, mood disorder, unspecified    Intervention/Counseling/Treatment Plan   · Medication Management:      1. Increase Wellbutrin SR to 300mg total QD for mood (200mg Q AM, 100mg at noon).    2. D/C Prozac.  Discussed potential for GI side effects, sexual dysfunction, mood destabilization, headaches     3. Call to report any worsening of symptoms or problems with the medication. Pt instructed to go to ER with thoughts of harming self, others     4. Patient given contact # for psychotherapists at [facility] and also instructed she may check with insurance for list of providers.      5. Labs: no new orders     Psychotherapy:   · Target symptoms: generalized and ruminative worrying  · Why chosen therapy is appropriate versus another modality: relevant to diagnosis, patient responds to this modality  · Outcome monitoring methods: self-report, observation, feedback from family   · Therapeutic intervention type: supportive psychotherapy  · Topics discussed/themes: building skills sets for symptom management, symptom recognition, nutrition, exercise  · The patient's response to the intervention is accepting. The patient's progress toward treatment goals is positive progress.  · Duration of intervention: 20 minutes     Return to clinic: 2 mo    -Spent 30min face to face with the pt; >50% time spent in counseling   -Supportive therapy and psychoeducation provided  -R/B/SE's of medications discussed with the pt who expresses understanding and chooses to take medications as prescribed.   -Pt instructed to call clinic, 911 or go to nearest emergency room if sxs worsen or pt is in   crisis. The pt expresses understanding.    Waldemar Michele, NP

## 2020-03-04 ENCOUNTER — OFFICE VISIT (OUTPATIENT)
Dept: PSYCHIATRY | Facility: CLINIC | Age: 35
End: 2020-03-04
Payer: COMMERCIAL

## 2020-03-04 VITALS
SYSTOLIC BLOOD PRESSURE: 113 MMHG | WEIGHT: 143.75 LBS | RESPIRATION RATE: 16 BRPM | DIASTOLIC BLOOD PRESSURE: 75 MMHG | HEART RATE: 72 BPM | BODY MASS INDEX: 24.54 KG/M2 | HEIGHT: 64 IN

## 2020-03-04 DIAGNOSIS — F39 UNSPECIFIED MOOD (AFFECTIVE) DISORDER: ICD-10-CM

## 2020-03-04 DIAGNOSIS — F41.1 GAD (GENERALIZED ANXIETY DISORDER): Primary | ICD-10-CM

## 2020-03-04 PROCEDURE — 99999 PR PBB SHADOW E&M-EST. PATIENT-LVL III: CPT | Mod: PBBFAC,,, | Performed by: NURSE PRACTITIONER

## 2020-03-04 PROCEDURE — 90833 PSYTX W PT W E/M 30 MIN: CPT | Mod: S$GLB,,, | Performed by: NURSE PRACTITIONER

## 2020-03-04 PROCEDURE — 99214 PR OFFICE/OUTPT VISIT, EST, LEVL IV, 30-39 MIN: ICD-10-PCS | Mod: S$GLB,,, | Performed by: NURSE PRACTITIONER

## 2020-03-04 PROCEDURE — 99999 PR PBB SHADOW E&M-EST. PATIENT-LVL III: ICD-10-PCS | Mod: PBBFAC,,, | Performed by: NURSE PRACTITIONER

## 2020-03-04 PROCEDURE — 3008F PR BODY MASS INDEX (BMI) DOCUMENTED: ICD-10-PCS | Mod: CPTII,S$GLB,, | Performed by: NURSE PRACTITIONER

## 2020-03-04 PROCEDURE — 3008F BODY MASS INDEX DOCD: CPT | Mod: CPTII,S$GLB,, | Performed by: NURSE PRACTITIONER

## 2020-03-04 PROCEDURE — 90833 PR PSYCHOTHERAPY W/PATIENT W/E&M, 30 MIN (ADD ON): ICD-10-PCS | Mod: S$GLB,,, | Performed by: NURSE PRACTITIONER

## 2020-03-04 PROCEDURE — 99214 OFFICE O/P EST MOD 30 MIN: CPT | Mod: S$GLB,,, | Performed by: NURSE PRACTITIONER

## 2020-03-04 RX ORDER — BUPROPION HYDROCHLORIDE 100 MG/1
100 TABLET, EXTENDED RELEASE ORAL DAILY
Qty: 30 TABLET | Refills: 2 | Status: SHIPPED | OUTPATIENT
Start: 2020-03-04 | End: 2020-05-06

## 2020-04-27 RX ORDER — BUPROPION HYDROCHLORIDE 200 MG/1
TABLET, EXTENDED RELEASE ORAL
Qty: 30 TABLET | Refills: 3 | Status: SHIPPED | OUTPATIENT
Start: 2020-04-27 | End: 2020-11-12 | Stop reason: SDUPTHER

## 2020-04-29 ENCOUNTER — PATIENT MESSAGE (OUTPATIENT)
Dept: PSYCHIATRY | Facility: CLINIC | Age: 35
End: 2020-04-29

## 2020-05-05 NOTE — PROGRESS NOTES
"Outpatient Psychiatry Follow-Up Visit    Clinical Status of Patient: Outpatient (Ambulatory)  05/05/2020     The patient location is:  [address] [place] [address]  [phone] ([phone]  The patient location [place] is:  [email or web address]  The patient phone number is: [phone]  Visit type: Virtual visit with synchronous audio and video  Each patient to whom he or she provides medical services by telemedicine is:  (1) informed of the relationship between the physician and patient and the respective role of any other health care provider with respect to management of the patient; and (2) notified that he or she may decline to receive medical services by telemedicine and may withdraw from such care at any time.    Chief Complaint: Pt is a 34 year old female who presents today for a follow-up. Met with patient.       Interval History and Content of Current Session:  Interim Events/Subjective Report/Content of Current Session: follow up appointment.    Pt is a 34 year old female with a hx of RENETTA, mood disorder NOS who presents for follow up treatment. She is currently taking Wellbutrin-SR 200mg QD. Current med regimen tolerated well. Today, pt reports some continued anxiety, restlessness. Denies any exacerbations of mood or depression.        Past Psychiatric hx: : Pt. is a 35 y/o female with a hx of RENETTA, mood disorder NOS who established care with me 2/19.. She has a history of miscarriage in 2017. She came to me taking Zoloft 100mg QD, which was prescribed by her OB-GYN following the birth of her second child on 09/03/2018, due to post-partum depression. She reports that this medication has possibly made her anxiety slightly worse. It has also made her "apathetic and emotionless." She reports that she would like to d/c the Zoloft and try something new. Previous med trials include Lexapro (prescribed at age 17 for anxiety, reports efficacy), Pristiq (was unsure if this worked due to concurrent use with Vyvanse), " "and Vyvanse.      She is currently taking Wellbutrin-SR 200mg QD, after a recent transition from Wellbutrin-XL. Pt has been adamant about monotherapy with Wellbutrin due to reading various sources and forums online. I have educated her that this med can potentially increase anxiety, and is better suited for treating depression. She states that she has been reading forums where people have reported better results with SR as opposed to XL. She then expressed her desire to be switched from XL to SR Since switching from XL to SR formulations, she reports a modest improvement in mood and anxiety levels. She also endorses "times where I just get happy" throughout the day. Fatigue has been problematic for her in the past, but she feels the Wellbutrin has helped with this. Pt states "I was just driving around the other day and told my , wow what a beautiful day. He was like wow, where did that come from?"    Pt has long experienced anxiety and characterizes herself as a "worrier". Following a miscarriage in 2017, her constant worry of herself getting cancer has now turned into worry about her children getting cancer. She endorses low motivation, poor energy, poor concentration, irritability, muscle tension.        Past Medical hx:   Past Medical History:   Diagnosis Date    ADD (attention deficit disorder)     Anxiety     Cystic hygroma     Diabetes mellitus     gdm        Interim hx:  Medication changes last visit: none  Anxiety: inc;d  Depression: denies     Denies suicidal/homicidal ideations.  Denies hopelessness/worthlessness.    Denies auditory/visual hallucinations      Alcohol: 4-5 nights weekly, one drink of liquor nightly  Drug: denies  Caffeine: 1 cup coffee daily  Tobacco: denies      Review of Systems   PSYCHIATRIC: Pertinent items are noted in the narrative.        CONSTITUTIONAL: weight stable        M/S: no pain today         ENT: no allergies noted today        ABD: no n/v/d     Past Medical, " Family and Social History: The patient's past medical, family and social history have been reviewed and updated as appropriate within the electronic medical record. See encounter notes.     Medication: Wellbutrin SR 200mg QD, prozac 20mg QD     Compliance: yes      Side effects: tolerates     Risk Parameters:  Patient reports no suicidal ideation  Patient reports no homicidal ideation  Patient reports no self-injurious behavior  Patient reports no violent behavior     Exam (detailed: at least 9 elements; comprehensive: all 15 elements)   Constitutional  Vitals:  Most recent vital signs, dated less than 90 days prior to this appointment, were reviewed. There were no vitals taken for this visit.     General:  unremarkable, age appropriate, casual attire, good eye contact, good rapport       Musculoskeletal  Muscle Strength/Tone:  no flaccidity, no tremor    Gait & Station:  normal      Psychiatric                       Speech:  normal tone, normal rate, rhythm, and volume   Mood & Affect:   Euthymic, congruent, appropriate         Thought Process:   Goal directed; Linear    Associations:   intact   Thought Content:   No SI/HI, delusions, or paranoia, no AV/VH   Insight & Judgement:   Good, adequate to circumstances   Orientation:   grossly intact; alert and oriented x 4    Memory:  intact for content of interview    Language:  grossly intact, can repeat    Attention Span  : Grossly intact for content of interview   Fund of Knowledge:   intact and appropriate to age and level of education        Assessment and Diagnosis   Status/Progress: Based on the examination today, the patient's problem(s) is/are under fair control.  New problems have not been presented today. Comorbidities are not currently complicating management of the primary condition.      Impression: Pt is a 34 year old female with a hx of RENETTA, mood disorder NOS who presents for follow up treatment. She is currently only taking Wellbutrin-SR 200mg QD.  Current med regimen tolerated well and provides good relief of depressive symptoms. She does note continued episodes of anxiety but consistently defers my recs of treatment with an SSRI. Overall, pt doing well without any major decompensations for some time now.         Diagnosis: RENETTA, mood disorder, unspecified    Intervention/Counseling/Treatment Plan   · Medication Management:      1. Continue Wellbutrin SR 200mg QD for mood.     2. Call to report any worsening of symptoms or problems with the medication. Pt instructed to go to ER with thoughts of harming self, others     3. Patient given contact # for psychotherapists at [facility] and also instructed she may check with insurance for list of providers.      4. Labs: no new orders       Return to clinic: 6 months - virtual    -Spent 30min face to face with the pt; >50% time spent in counseling   -Supportive therapy and psychoeducation provided  -R/B/SE's of medications discussed with the pt who expresses understanding and chooses to take medications as prescribed.   -Pt instructed to call clinic, 911 or go to nearest emergency room if sxs worsen or pt is in   crisis. The pt expresses understanding.    Waldemar Michele, NP

## 2020-05-06 ENCOUNTER — OFFICE VISIT (OUTPATIENT)
Dept: PSYCHIATRY | Facility: CLINIC | Age: 35
End: 2020-05-06
Payer: COMMERCIAL

## 2020-05-06 DIAGNOSIS — F41.1 GAD (GENERALIZED ANXIETY DISORDER): Primary | ICD-10-CM

## 2020-05-06 DIAGNOSIS — F39 UNSPECIFIED MOOD (AFFECTIVE) DISORDER: ICD-10-CM

## 2020-05-06 PROCEDURE — 99213 PR OFFICE/OUTPT VISIT, EST, LEVL III, 20-29 MIN: ICD-10-PCS | Mod: 95,,, | Performed by: NURSE PRACTITIONER

## 2020-05-06 PROCEDURE — 99213 OFFICE O/P EST LOW 20 MIN: CPT | Mod: 95,,, | Performed by: NURSE PRACTITIONER

## 2020-10-05 ENCOUNTER — PATIENT MESSAGE (OUTPATIENT)
Dept: ADMINISTRATIVE | Facility: HOSPITAL | Age: 35
End: 2020-10-05

## 2020-11-12 ENCOUNTER — OFFICE VISIT (OUTPATIENT)
Dept: PSYCHIATRY | Facility: CLINIC | Age: 35
End: 2020-11-12
Payer: COMMERCIAL

## 2020-11-12 DIAGNOSIS — F39 UNSPECIFIED MOOD (AFFECTIVE) DISORDER: ICD-10-CM

## 2020-11-12 DIAGNOSIS — F41.1 GAD (GENERALIZED ANXIETY DISORDER): Primary | ICD-10-CM

## 2020-11-12 PROCEDURE — 99213 PR OFFICE/OUTPT VISIT, EST, LEVL III, 20-29 MIN: ICD-10-PCS | Mod: 95,,, | Performed by: NURSE PRACTITIONER

## 2020-11-12 PROCEDURE — 99213 OFFICE O/P EST LOW 20 MIN: CPT | Mod: 95,,, | Performed by: NURSE PRACTITIONER

## 2020-11-12 RX ORDER — BUPROPION HYDROCHLORIDE 200 MG/1
200 TABLET, EXTENDED RELEASE ORAL DAILY
Qty: 30 TABLET | Refills: 3 | Status: SHIPPED | OUTPATIENT
Start: 2020-11-12 | End: 2021-04-20

## 2020-11-12 NOTE — PROGRESS NOTES
"Outpatient Psychiatry Follow-Up Visit    Clinical Status of Patient: Outpatient (Ambulatory)  11/12/2020     The patient location is:  [address] [place] [address]  [phone] ()  The patient location [place] is:  [place]  The patient phone number is: [phone]  Visit type: Virtual visit with synchronous audio and video  Each patient to whom he or she provides medical services by telemedicine is:  (1) informed of the relationship between the physician and patient and the respective role of any other health care provider with respect to management of the patient; and (2) notified that he or she may decline to receive medical services by telemedicine and may withdraw from such care at any time.    Chief Complaint: Pt is a 34 year old female who presents today for a follow-up. Met with patient.       Interval History and Content of Current Session:  Interim Events/Subjective Report/Content of Current Session: follow up appointment.    Pt is a 34 year old female with a hx of RENETTA, mood disorder NOS who presents for follow up treatment. She is currently taking Wellbutrin-SR 200mg QD. Current med regimen tolerated well. Today, pt reports good control of depression and anxiety. She notes that she did go a few weeks without her medication between sessions and noticed a brief exacerbation of these symptoms, but things improved once resuming. Sleeping well, good appetite.           Past Psychiatric hx: : Pt. is a 33 y/o female with a hx of RENETTA, mood disorder NOS who established care with me 2/19.. She has a history of miscarriage in 2017. She came to me taking Zoloft 100mg QD, which was prescribed by her OB-GYN following the birth of her second child on 09/03/2018, due to post-partum depression. She reports that this medication has possibly made her anxiety slightly worse. It has also made her "apathetic and emotionless." She reports that she would like to d/c the Zoloft and try something new. Previous " "med trials include Lexapro (prescribed at age 17 for anxiety, reports efficacy), Pristiq (was unsure if this worked due to concurrent use with Vyvanse), and Vyvanse.      She is currently taking Wellbutrin-SR 200mg QD, after a recent transition from Wellbutrin-XL. Pt has been adamant about monotherapy with Wellbutrin due to reading various sources and forums online. I have educated her that this med can potentially increase anxiety, and is better suited for treating depression. She states that she has been reading forums where people have reported better results with SR as opposed to XL. She then expressed her desire to be switched from XL to SR Since switching from XL to SR formulations, she reports a modest improvement in mood and anxiety levels. She also endorses "times where I just get happy" throughout the day. Fatigue has been problematic for her in the past, but she feels the Wellbutrin has helped with this. Pt states "I was just driving around the other day and told my , wow what a beautiful day. He was like wow, where did that come from?"    Pt has long experienced anxiety and characterizes herself as a "worrier". Following a miscarriage in 2017, her constant worry of herself getting cancer has now turned into worry about her children getting cancer. She endorses low motivation, poor energy, poor concentration, irritability, muscle tension.        Past Medical hx:   Past Medical History:   Diagnosis Date    ADD (attention deficit disorder)     Anxiety     Cystic hygroma     Diabetes mellitus     gdm        Interim hx:  Medication changes last visit: none  Anxiety: inc;d  Depression: denies     Denies suicidal/homicidal ideations.  Denies hopelessness/worthlessness.    Denies auditory/visual hallucinations      Alcohol: 4-5 nights weekly, one drink of liquor nightly  Drug: denies  Caffeine: 1 cup coffee daily  Tobacco: denies      Review of Systems   PSYCHIATRIC: Pertinent items are noted in the " narrative.        CONSTITUTIONAL: weight stable        M/S: no pain today         ENT: no allergies noted today        ABD: no n/v/d     Past Medical, Family and Social History: The patient's past medical, family and social history have been reviewed and updated as appropriate within the electronic medical record. See encounter notes.     Medication: Wellbutrin SR 200mg QD     Compliance: yes      Side effects: tolerates     Risk Parameters:  Patient reports no suicidal ideation  Patient reports no homicidal ideation  Patient reports no self-injurious behavior  Patient reports no violent behavior     Exam (detailed: at least 9 elements; comprehensive: all 15 elements)   Constitutional  Vitals:  Most recent vital signs, dated less than 90 days prior to this appointment, were reviewed. There were no vitals taken for this visit.     General:  unremarkable, age appropriate, casual attire, good eye contact, good rapport       Musculoskeletal  Muscle Strength/Tone:  no flaccidity, no tremor    Gait & Station:  normal      Psychiatric                       Speech:  normal tone, normal rate, rhythm, and volume   Mood & Affect:   Euthymic, congruent, appropriate         Thought Process:   Goal directed; Linear    Associations:   intact   Thought Content:   No SI/HI, delusions, or paranoia, no AV/VH   Insight & Judgement:   Good, adequate to circumstances   Orientation:   grossly intact; alert and oriented x 4    Memory:  intact for content of interview    Language:  grossly intact, can repeat    Attention Span  : Grossly intact for content of interview   Fund of Knowledge:   intact and appropriate to age and level of education        Assessment and Diagnosis   Status/Progress: Based on the examination today, the patient's problem(s) is/are under fair control.  New problems have not been presented today. Comorbidities are not currently complicating management of the primary condition.      Impression: Pt is a 34 year old  female with a hx of RENETTA, mood disorder NOS who presents for follow up treatment. She is currently only taking Wellbutrin-SR 200mg QD. Current med regimen tolerated well and provides good relief of depressive symptoms. She does note continued episodes of anxiety but consistently defers my recs of treatment with an SSRI. Overall, pt doing well without any major decompensations for some time now.         Diagnosis: RENETTA, mood disorder, unspecified    Intervention/Counseling/Treatment Plan   · Medication Management:      1. Continue Wellbutrin SR 200mg QD for mood.     2. Call to report any worsening of symptoms or problems with the medication. Pt instructed to go to ER with thoughts of harming self, others     3. Patient given contact # for psychotherapists at [facility] and also instructed she may check with insurance for list of providers.      4. Labs: no new orders       Return to clinic: 6 months - self schedule    -Spent 30min face to face with the pt; >50% time spent in counseling   -Supportive therapy and psychoeducation provided  -R/B/SE's of medications discussed with the pt who expresses understanding and chooses to take medications as prescribed.   -Pt instructed to call clinic, 911 or go to nearest emergency room if sxs worsen or pt is in   crisis. The pt expresses understanding.    Waldemar Michele, NP

## 2021-01-04 ENCOUNTER — PATIENT MESSAGE (OUTPATIENT)
Dept: ADMINISTRATIVE | Facility: HOSPITAL | Age: 36
End: 2021-01-04

## 2021-02-26 ENCOUNTER — PATIENT MESSAGE (OUTPATIENT)
Dept: FAMILY MEDICINE | Facility: CLINIC | Age: 36
End: 2021-02-26

## 2021-02-26 DIAGNOSIS — R00.2 PALPITATION: ICD-10-CM

## 2021-02-26 RX ORDER — METOPROLOL TARTRATE 25 MG/1
25 TABLET, FILM COATED ORAL 2 TIMES DAILY PRN
Qty: 60 TABLET | Refills: 0 | Status: SHIPPED | OUTPATIENT
Start: 2021-02-26 | End: 2021-03-23

## 2021-03-20 DIAGNOSIS — R00.2 PALPITATION: ICD-10-CM

## 2021-03-24 RX ORDER — METOPROLOL TARTRATE 25 MG/1
25 TABLET, FILM COATED ORAL 2 TIMES DAILY PRN
Qty: 60 TABLET | Refills: 0 | Status: SHIPPED | OUTPATIENT
Start: 2021-03-24 | End: 2024-03-04

## 2021-04-29 ENCOUNTER — PATIENT MESSAGE (OUTPATIENT)
Dept: RESEARCH | Facility: HOSPITAL | Age: 36
End: 2021-04-29

## 2021-05-13 ENCOUNTER — OFFICE VISIT (OUTPATIENT)
Dept: PSYCHIATRY | Facility: CLINIC | Age: 36
End: 2021-05-13
Payer: COMMERCIAL

## 2021-05-13 DIAGNOSIS — F39 UNSPECIFIED MOOD (AFFECTIVE) DISORDER: Primary | ICD-10-CM

## 2021-05-13 DIAGNOSIS — F41.1 GAD (GENERALIZED ANXIETY DISORDER): ICD-10-CM

## 2021-05-13 PROCEDURE — 99214 OFFICE O/P EST MOD 30 MIN: CPT | Mod: 95,,, | Performed by: NURSE PRACTITIONER

## 2021-05-13 PROCEDURE — 99214 PR OFFICE/OUTPT VISIT, EST, LEVL IV, 30-39 MIN: ICD-10-PCS | Mod: 95,,, | Performed by: NURSE PRACTITIONER

## 2021-07-07 ENCOUNTER — PATIENT MESSAGE (OUTPATIENT)
Dept: ADMINISTRATIVE | Facility: HOSPITAL | Age: 36
End: 2021-07-07

## 2021-11-11 ENCOUNTER — OFFICE VISIT (OUTPATIENT)
Dept: PSYCHIATRY | Facility: CLINIC | Age: 36
End: 2021-11-11
Payer: COMMERCIAL

## 2021-11-11 DIAGNOSIS — F41.1 GAD (GENERALIZED ANXIETY DISORDER): Primary | ICD-10-CM

## 2021-11-11 DIAGNOSIS — F39 UNSPECIFIED MOOD (AFFECTIVE) DISORDER: ICD-10-CM

## 2021-11-11 PROCEDURE — 99214 PR OFFICE/OUTPT VISIT, EST, LEVL IV, 30-39 MIN: ICD-10-PCS | Mod: 95,,, | Performed by: NURSE PRACTITIONER

## 2021-11-11 PROCEDURE — 1159F MED LIST DOCD IN RCRD: CPT | Mod: CPTII,95,, | Performed by: NURSE PRACTITIONER

## 2021-11-11 PROCEDURE — 1159F PR MEDICATION LIST DOCUMENTED IN MEDICAL RECORD: ICD-10-PCS | Mod: CPTII,95,, | Performed by: NURSE PRACTITIONER

## 2021-11-11 PROCEDURE — 1160F RVW MEDS BY RX/DR IN RCRD: CPT | Mod: CPTII,95,, | Performed by: NURSE PRACTITIONER

## 2021-11-11 PROCEDURE — 1160F PR REVIEW ALL MEDS BY PRESCRIBER/CLIN PHARMACIST DOCUMENTED: ICD-10-PCS | Mod: CPTII,95,, | Performed by: NURSE PRACTITIONER

## 2021-11-11 PROCEDURE — 99214 OFFICE O/P EST MOD 30 MIN: CPT | Mod: 95,,, | Performed by: NURSE PRACTITIONER

## 2021-11-11 RX ORDER — BUPROPION HYDROCHLORIDE 100 MG/1
100 TABLET, EXTENDED RELEASE ORAL DAILY
Qty: 90 TABLET | Refills: 1 | Status: SHIPPED | OUTPATIENT
Start: 2021-11-11 | End: 2022-05-11

## 2022-04-22 NOTE — PROGRESS NOTES
Problem: Falls - Risk of:  Goal: Will remain free from falls  Description: Will remain free from falls  Outcome: Progressing   All fall precautions in place. Bed in low position, alarm activated and appropriate use of call light. Problem: Infection:  Goal: Will remain free from infection  Description: Will remain free from infection  Outcome: Progressing     Problem: Safety:  Goal: Free from accidental physical injury  Description: Free from accidental physical injury  Outcome: Progressing     Problem: Daily Care:  Goal: Daily care needs are met  Description: Daily care needs are met  Outcome: Progressing     Problem: Pain:  Goal: Patient's pain/discomfort is manageable  Description: Patient's pain/discomfort is manageable  Outcome: Progressing   Pain Assessment: None - Denies Pain  Pain Level: 0   Patient's Stated Pain Goal: 0 - No pain   Is pain goal met at this time? Yes      Problem: Skin Integrity:  Goal: Skin integrity will stabilize  Description: Skin integrity will stabilize  Outcome: Progressing   Skin assessment completed. Patient turned every 2 hours and as needed. No skin breakdown this shift. Problem: Respiratory - Adult  Goal: Clear lung sounds  Description: Clear lung sounds  4/21/2022 2054 by Jamarcus Lew RCP  Outcome: Progressing   Lungs still sound congested at times. Pt coughing up phlem. Continue respiratory tx. Problem: Discharge Planning:  Goal: Patients continuum of care needs are met  Description: Patients continuum of care needs are met  Outcome: Progressing   Discharge date is tentative for today.     Electronically signed by Wero Santoyo RN on 4/22/2022 at 1:25 AM Requested pap report

## 2022-05-11 ENCOUNTER — PATIENT MESSAGE (OUTPATIENT)
Dept: PSYCHIATRY | Facility: CLINIC | Age: 37
End: 2022-05-11
Payer: COMMERCIAL

## 2022-05-12 ENCOUNTER — OFFICE VISIT (OUTPATIENT)
Dept: PSYCHIATRY | Facility: CLINIC | Age: 37
End: 2022-05-12
Payer: COMMERCIAL

## 2022-05-12 DIAGNOSIS — F41.1 GAD (GENERALIZED ANXIETY DISORDER): ICD-10-CM

## 2022-05-12 DIAGNOSIS — F39 UNSPECIFIED MOOD (AFFECTIVE) DISORDER: Primary | ICD-10-CM

## 2022-05-12 PROCEDURE — 1159F MED LIST DOCD IN RCRD: CPT | Mod: CPTII,95,, | Performed by: NURSE PRACTITIONER

## 2022-05-12 PROCEDURE — 99213 PR OFFICE/OUTPT VISIT, EST, LEVL III, 20-29 MIN: ICD-10-PCS | Mod: 95,,, | Performed by: NURSE PRACTITIONER

## 2022-05-12 PROCEDURE — 1160F RVW MEDS BY RX/DR IN RCRD: CPT | Mod: CPTII,95,, | Performed by: NURSE PRACTITIONER

## 2022-05-12 PROCEDURE — 1160F PR REVIEW ALL MEDS BY PRESCRIBER/CLIN PHARMACIST DOCUMENTED: ICD-10-PCS | Mod: CPTII,95,, | Performed by: NURSE PRACTITIONER

## 2022-05-12 PROCEDURE — 1159F PR MEDICATION LIST DOCUMENTED IN MEDICAL RECORD: ICD-10-PCS | Mod: CPTII,95,, | Performed by: NURSE PRACTITIONER

## 2022-05-12 PROCEDURE — 99213 OFFICE O/P EST LOW 20 MIN: CPT | Mod: 95,,, | Performed by: NURSE PRACTITIONER

## 2022-05-12 NOTE — PROGRESS NOTES
Outpatient Psychiatry Follow-Up Visit    Clinical Status of Patient: Outpatient (Ambulatory)  05/12/2022     The patient location is:  [address] [place] [address]  [phone] ()  The patient [place]  The patient phone number is: [phone]  Visit type: Virtual visit with synchronous audio and video  Each patient to whom he or she provides medical services by telemedicine is:  (1) informed of the relationship between the physician and patient and the respective role of any other health care provider with respect to management of the patient; and (2) notified that he or she may decline to receive medical services by telemedicine and may withdraw from such care at any time.    Chief Complaint: Pt is a 34 year old female who presents today for a follow-up. Met with patient.       Interval History and Content of Current Session:  Interim Events/Subjective Report/Content of Current Session: follow up appointment.    Pt is a 34 year old female with a hx of RENETTA, mood disorder NOS who presents for follow up treatment. She is currently taking Wellbutrin-SR 200mg QD. Current med regimen tolerated well and provide good symptomatic relief overall.     Between sessions, pt notes that her symptoms are well-controlled. She denies any excessive or unproductive worry, outside of usual life stressors. She denies any decompensations of mood, and denies any anhedonia or hopelessness. She is sleeping well, and has a good appetite. Good energy levels and good ability to concentrate. Pt is stable and functioning well.     Past Psychiatric hx: : Pt. is a 35 y/o female with a hx of RENETTA, mood disorder NOS who established care with me 2/19.. She has a history of miscarriage in 2017. She came to me taking Zoloft 100mg QD, which was prescribed by her OB-GYN following the birth of her second child on 09/03/2018, due to post-partum depression. She reports that this medication has possibly made her  "anxiety slightly worse. It has also made her "apathetic and emotionless." She reports that she would like to d/c the Zoloft and try something new. Previous med trials include Lexapro (prescribed at age 17 for anxiety, reports efficacy), Pristiq (was unsure if this worked due to concurrent use with Vyvanse), and Vyvanse.      She is currently taking Wellbutrin-SR 200mg QD, after a recent transition from Wellbutrin-XL. Pt has been adamant about monotherapy with Wellbutrin due to reading various sources and forums online. I have educated her that this med can potentially increase anxiety, and is better suited for treating depression. She states that she has been reading forums where people have reported better results with SR as opposed to XL. She then expressed her desire to be switched from XL to SR Since switching from XL to SR formulations, she reports a modest improvement in mood and anxiety levels. She also endorses "times where I just get happy" throughout the day. Fatigue has been problematic for her in the past, but she feels the Wellbutrin has helped with this. Pt states "I was just driving around the other day and told my , wow what a beautiful day. He was like wow, where did that come from?"    Pt has long experienced anxiety and characterizes herself as a "worrier". Following a miscarriage in 2017, her constant worry of herself getting cancer has now turned into worry about her children getting cancer. She endorses low motivation, poor energy, poor concentration, irritability, muscle tension.        Past Medical hx:   Past Medical History:   Diagnosis Date    ADD (attention deficit disorder)     Anxiety     Cystic hygroma     Diabetes mellitus     gdm        Interim hx:  Medication changes last visit: none  Anxiety: inc;d  Depression: denies     Denies suicidal/homicidal ideations.  Denies hopelessness/worthlessness.    Denies auditory/visual hallucinations      Alcohol: 4-5 nights weekly, one " drink of liquor nightly  Drug: denies  Caffeine: 1 cup coffee daily  Tobacco: denies      Review of Systems   PSYCHIATRIC: Pertinent items are noted in the narrative.        CONSTITUTIONAL: weight stable        M/S: no pain today         ENT: no allergies noted today        ABD: no n/v/d     Past Medical, Family and Social History: The patient's past medical, family and social history have been reviewed and updated as appropriate within the electronic medical record. See encounter notes.     Medication: Wellbutrin SR 200mg QD     Compliance: yes      Side effects: tolerates     Risk Parameters:  Patient reports no suicidal ideation  Patient reports no homicidal ideation  Patient reports no self-injurious behavior  Patient reports no violent behavior     Exam (detailed: at least 9 elements; comprehensive: all 15 elements)   Constitutional  Vitals:  Most recent vital signs, dated less than 90 days prior to this appointment, were reviewed. There were no vitals taken for this visit.     General:  unremarkable, age appropriate, casual attire, good eye contact, good rapport       Musculoskeletal  Muscle Strength/Tone:  no flaccidity, no tremor    Gait & Station:  normal      Psychiatric                       Speech:  normal tone, normal rate, rhythm, and volume   Mood & Affect:   Euthymic, congruent, appropriate         Thought Process:   Goal directed; Linear    Associations:   intact   Thought Content:   No SI/HI, delusions, or paranoia, no AV/VH   Insight & Judgement:   Good, adequate to circumstances   Orientation:   grossly intact; alert and oriented x 4    Memory:  intact for content of interview    Language:  grossly intact, can repeat    Attention Span  : Grossly intact for content of interview   Fund of Knowledge:   intact and appropriate to age and level of education        Assessment and Diagnosis   Status/Progress: Based on the examination today, the patient's problem(s) is/are under fair control.  New  problems have not been presented today. Comorbidities are not currently complicating management of the primary condition.      Impression:Pt is a 34 year old female with a hx of RENETTA, mood disorder NOS who presents for follow up treatment. She is currently taking Wellbutrin-SR 200mg QD. Current med regimen tolerated well and provide good symptomatic relief overall.     Between sessions, pt notes that her symptoms are well-controlled. She denies any excessive or unproductive worry, outside of usual life stressors. She denies any decompensations of mood, and denies any anhedonia or hopelessness. She is sleeping well, and has a good appetite. Good energy levels and good ability to concentrate. Pt is stable and functioning well.       Diagnosis: RENETTA, mood disorder, unspecified    Intervention/Counseling/Treatment Plan   · Medication Management:      1. Continue Wellbutrin SR 100mg QD for mood.     2. Call to report any worsening of symptoms or problems with the medication. Pt instructed to go to ER with thoughts of harming self, others     3. Patient given contact # for psychotherapists at [facility] and also instructed she may check with insurance for list of providers.      4. Labs: no new orders       Return to clinic: 6 months - self schedule    -Spent 30min face to face with the pt; >50% time spent in counseling   -Supportive therapy and psychoeducation provided  -R/B/SE's of medications discussed with the pt who expresses understanding and chooses to take medications as prescribed.   -Pt instructed to call clinic, 911 or go to nearest emergency room if sxs worsen or pt is in   crisis. The pt expresses understanding.    Waldemar Michele, NP

## 2022-05-31 ENCOUNTER — PATIENT MESSAGE (OUTPATIENT)
Dept: ADMINISTRATIVE | Facility: HOSPITAL | Age: 37
End: 2022-05-31
Payer: COMMERCIAL

## 2022-06-27 ENCOUNTER — OFFICE VISIT (OUTPATIENT)
Dept: FAMILY MEDICINE | Facility: CLINIC | Age: 37
End: 2022-06-27
Payer: COMMERCIAL

## 2022-06-27 DIAGNOSIS — K52.9 ENTERITIS: Primary | ICD-10-CM

## 2022-06-27 PROCEDURE — 99213 OFFICE O/P EST LOW 20 MIN: CPT | Mod: 95,,, | Performed by: PHYSICIAN ASSISTANT

## 2022-06-27 PROCEDURE — 99213 PR OFFICE/OUTPT VISIT, EST, LEVL III, 20-29 MIN: ICD-10-PCS | Mod: 95,,, | Performed by: PHYSICIAN ASSISTANT

## 2022-06-27 RX ORDER — ONDANSETRON 8 MG/1
8 TABLET, ORALLY DISINTEGRATING ORAL 3 TIMES DAILY
Qty: 15 TABLET | Refills: 0 | Status: SHIPPED | OUTPATIENT
Start: 2022-06-27 | End: 2023-07-25

## 2022-06-27 NOTE — PROGRESS NOTES
Subjective:       Patient ID: Donna Nguyen is a 36 y.o. female.    Chief Complaint: Emesis    The patient location is: [place], [place]  The chief complaint leading to consultation is: Vomiting    Visit type: audiovisual    Face to Face time with patient: 20 minutes of total time spent on the encounter, which includes face to face time and non-face to face time preparing to see the patient (eg, review of tests), Obtaining and/or reviewing separately obtained history, Documenting clinical information in the electronic or other health record, Independently interpreting results (not separately reported) and communicating results to the patient/family/caregiver, or Care coordination (not separately reported).         Each patient to whom he or she provides medical services by telemedicine is:  (1) informed of the relationship between the physician and patient and the respective role of any other health care provider with respect to management of the patient; and (2) notified that he or she may decline to receive medical services by telemedicine and may withdraw from such care at any time.    Notes:     Emesis   This is a new problem. The current episode started yesterday. The problem occurs 5 to 10 times per day. The problem has been waxing and waning. The emesis has an appearance of stomach contents. There has been no fever. Associated symptoms include abdominal pain and headaches. Pertinent negatives include no arthralgias, chest pain, chills, diarrhea, dizziness, fever, myalgias or URI. Risk factors include ill contacts. She has tried bed rest, diet change, increased fluids, sleep and anti-emetic for the symptoms. The treatment provided mild relief.     Past Medical History:   Diagnosis Date    ADD (attention deficit disorder)     Anxiety     Cystic hygroma     Diabetes mellitus     gdm       Review of Systems   Constitutional: Negative for activity change, chills, fever and unexpected weight change.    HENT: Negative for hearing loss, rhinorrhea and trouble swallowing.    Eyes: Negative for discharge and visual disturbance.   Respiratory: Negative for chest tightness and wheezing.    Cardiovascular: Negative for chest pain and palpitations.   Gastrointestinal: Positive for abdominal pain and vomiting. Negative for blood in stool, constipation and diarrhea.   Endocrine: Negative for polydipsia and polyuria.   Genitourinary: Negative for difficulty urinating, dysuria, hematuria and menstrual problem.   Musculoskeletal: Negative for arthralgias, joint swelling, myalgias and neck pain.   Neurological: Positive for headaches. Negative for dizziness and weakness.   Psychiatric/Behavioral: Negative for confusion and dysphoric mood.         Objective:      Physical Exam  Constitutional:       General: She is not in acute distress.     Appearance: Normal appearance. She is ill-appearing. She is not toxic-appearing or diaphoretic.   Pulmonary:      Effort: Pulmonary effort is normal.   Neurological:      Mental Status: She is alert.   Psychiatric:         Mood and Affect: Mood normal.         Assessment:       Problem List Items Addressed This Visit    None     Visit Diagnoses     Enteritis    -  Primary          Plan:       Enteritis    Other orders  -     ondansetron (ZOFRAN-ODT) 8 MG TbDL; Take 1 tablet (8 mg total) by mouth 3 (three) times daily.  Dispense: 15 tablet; Refill: 0

## 2022-06-27 NOTE — PATIENT INSTRUCTIONS
Clear liquids to a soft bland diet as tolerated. Take tylenol as needed for fever. Avoid alcohol and coffee.  Go to the emergency room if symptoms do not improve in 24 to 48 hours.

## 2022-11-03 ENCOUNTER — PATIENT MESSAGE (OUTPATIENT)
Dept: PSYCHIATRY | Facility: CLINIC | Age: 37
End: 2022-11-03
Payer: COMMERCIAL

## 2022-11-04 ENCOUNTER — OFFICE VISIT (OUTPATIENT)
Dept: PSYCHIATRY | Facility: CLINIC | Age: 37
End: 2022-11-04
Payer: COMMERCIAL

## 2022-11-04 DIAGNOSIS — F41.1 GAD (GENERALIZED ANXIETY DISORDER): Primary | ICD-10-CM

## 2022-11-04 DIAGNOSIS — F39 UNSPECIFIED MOOD (AFFECTIVE) DISORDER: ICD-10-CM

## 2022-11-04 PROCEDURE — 99999 PR PBB SHADOW E&M-EST. PATIENT-LVL II: ICD-10-PCS | Mod: PBBFAC,,, | Performed by: NURSE PRACTITIONER

## 2022-11-04 PROCEDURE — 1160F RVW MEDS BY RX/DR IN RCRD: CPT | Mod: CPTII,95,S$GLB, | Performed by: NURSE PRACTITIONER

## 2022-11-04 PROCEDURE — 1159F MED LIST DOCD IN RCRD: CPT | Mod: CPTII,95,S$GLB, | Performed by: NURSE PRACTITIONER

## 2022-11-04 PROCEDURE — 99214 OFFICE O/P EST MOD 30 MIN: CPT | Mod: 95,S$GLB,, | Performed by: NURSE PRACTITIONER

## 2022-11-04 PROCEDURE — 1159F PR MEDICATION LIST DOCUMENTED IN MEDICAL RECORD: ICD-10-PCS | Mod: CPTII,95,S$GLB, | Performed by: NURSE PRACTITIONER

## 2022-11-04 PROCEDURE — 99999 PR PBB SHADOW E&M-EST. PATIENT-LVL II: CPT | Mod: PBBFAC,,, | Performed by: NURSE PRACTITIONER

## 2022-11-04 PROCEDURE — 99214 PR OFFICE/OUTPT VISIT, EST, LEVL IV, 30-39 MIN: ICD-10-PCS | Mod: 95,S$GLB,, | Performed by: NURSE PRACTITIONER

## 2022-11-04 PROCEDURE — 1160F PR REVIEW ALL MEDS BY PRESCRIBER/CLIN PHARMACIST DOCUMENTED: ICD-10-PCS | Mod: CPTII,95,S$GLB, | Performed by: NURSE PRACTITIONER

## 2022-11-04 RX ORDER — FLUOXETINE HYDROCHLORIDE 20 MG/1
20 CAPSULE ORAL DAILY
Qty: 30 CAPSULE | Refills: 1 | Status: SHIPPED | OUTPATIENT
Start: 2022-11-04 | End: 2022-11-29

## 2022-11-04 NOTE — PROGRESS NOTES
"Outpatient Psychiatry Follow-Up Visit    Clinical Status of Patient: Outpatient (Virtual)  11/04/2022     The patient location is:  [address] [place] [address]  [phone] (M)  The patient location [place] is: [facility]  The patient phone number is: [phone]  Visit type: Virtual visit with synchronous audio and video  Each patient to whom he or she provides medical services by telemedicine is:  (1) informed of the relationship between the physician and patient and the respective role of any other health care provider with respect to management of the patient; and (2) notified that he or she may decline to receive medical services by telemedicine and may withdraw from such care at any time.    Chief Complaint: Pt is a 34 year old female who presents today for a follow-up. Met with patient.       Interval History and Content of Current Session:  Interim Events/Subjective Report/Content of Current Session: follow up appointment.    Pt is a 34 year old female with a hx of RENETTA, mood disorder NOS who presents for follow up treatment. She is currently taking Wellbutrin-SR 200mg QD. Current med regimen tolerated well and provide good symptomatic relief overall.     Since last visit, pt states "I wanted to talk to you about adding in the Prozac. I've been feeling kind of low and irritable. I haven't felt this way in awhile." Pt notes increased generalized, ruminative worrying. She also notes periods of apathy sadness. Denies hopelessness or worthlessness.        Past Psychiatric hx: : Pt. is a 33 y/o female with a hx of RENETTA, mood disorder NOS who established care with me 2/19.. She has a history of miscarriage in 2017. She came to me taking Zoloft 100mg QD, which was prescribed by her OB-GYN following the birth of her second child on 09/03/2018, due to post-partum depression. She reports that this medication has possibly made her anxiety slightly worse. It has also made her "apathetic and emotionless." " "She reports that she would like to d/c the Zoloft and try something new. Previous med trials include Lexapro (prescribed at age 17 for anxiety, reports efficacy), Pristiq (was unsure if this worked due to concurrent use with Vyvanse), and Vyvanse.      She is currently taking Wellbutrin-SR 200mg QD, after a recent transition from Wellbutrin-XL. Pt has been adamant about monotherapy with Wellbutrin due to reading various sources and forums online. I have educated her that this med can potentially increase anxiety, and is better suited for treating depression. She states that she has been reading forums where people have reported better results with SR as opposed to XL. She then expressed her desire to be switched from XL to SR Since switching from XL to SR formulations, she reports a modest improvement in mood and anxiety levels. She also endorses "times where I just get happy" throughout the day. Fatigue has been problematic for her in the past, but she feels the Wellbutrin has helped with this. Pt states "I was just driving around the other day and told my , wow what a beautiful day. He was like wow, where did that come from?"    Pt has long experienced anxiety and characterizes herself as a "worrier". Following a miscarriage in 2017, her constant worry of herself getting cancer has now turned into worry about her children getting cancer. She endorses low motivation, poor energy, poor concentration, irritability, muscle tension.        Past Medical hx:   Past Medical History:   Diagnosis Date    ADD (attention deficit disorder)     Anxiety     Cystic hygroma     Diabetes mellitus     gdm        Interim hx:  Medication changes last visit: none  Anxiety: inc;d  Depression: denies     Denies suicidal/homicidal ideations.  Denies hopelessness/worthlessness.    Denies auditory/visual hallucinations      Alcohol: 4-5 nights weekly, one drink of liquor nightly  Drug: denies  Caffeine: 1 cup coffee daily  Tobacco: " denies      Review of Systems   PSYCHIATRIC: Pertinent items are noted in the narrative.        CONSTITUTIONAL: weight stable        M/S: no pain today         ENT: no allergies noted today        ABD: no n/v/d     Past Medical, Family and Social History: The patient's past medical, family and social history have been reviewed and updated as appropriate within the electronic medical record. See encounter notes.     Medication: Wellbutrin SR 200mg QD     Compliance: yes      Side effects: tolerates     Risk Parameters:  Patient reports no suicidal ideation  Patient reports no homicidal ideation  Patient reports no self-injurious behavior  Patient reports no violent behavior     Exam (detailed: at least 9 elements; comprehensive: all 15 elements)   Constitutional  Vitals:  Most recent vital signs, dated less than 90 days prior to this appointment, were reviewed. There were no vitals taken for this visit.     General:  unremarkable, age appropriate, casual attire, good eye contact, good rapport       Musculoskeletal  Muscle Strength/Tone:  no flaccidity, no tremor    Gait & Station:  normal      Psychiatric                       Speech:  normal tone, normal rate, rhythm, and volume   Mood & Affect:   Euthymic, congruent, appropriate         Thought Process:   Goal directed; Linear    Associations:   intact   Thought Content:   No SI/HI, delusions, or paranoia, no AV/VH   Insight & Judgement:   Good, adequate to circumstances   Orientation:   grossly intact; alert and oriented x 4    Memory:  intact for content of interview    Language:  grossly intact, can repeat    Attention Span  : Grossly intact for content of interview   Fund of Knowledge:   intact and appropriate to age and level of education        Assessment and Diagnosis   Status/Progress: Based on the examination today, the patient's problem(s) is/are under fair control.  New problems have not been presented today. Comorbidities are not currently complicating  management of the primary condition.      Impression:Pt is a 34 year old female with a hx of RENETTA, mood disorder NOS who presents for follow up treatment. She is currently taking Wellbutrin-SR 200mg QD. Current med regimen tolerated well and provide good symptomatic relief overall.     Between sessions, pt notes that her symptoms are well-controlled. She denies any excessive or unproductive worry, outside of usual life stressors. She denies any decompensations of mood, and denies any anhedonia or hopelessness. She is sleeping well, and has a good appetite. Good energy levels and good ability to concentrate. Pt is stable and functioning well.       Diagnosis: RENETTA, mood disorder, unspecified    Intervention/Counseling/Treatment Plan   Medication Management:      1. Continue Wellbutrin SR 100mg QD for mood.    2. Restart Prozac 20mg QD     2. Call to report any worsening of symptoms or problems with the medication. Pt instructed to go to ER with thoughts of harming self, others     3. Patient given contact # for psychotherapists at [facility] and also instructed she may check with insurance for list of providers.      4. Labs: no new orders       Return to clinic: 1 months - self schedule    -Spent 30min face to face with the pt; >50% time spent in counseling   -Supportive therapy and psychoeducation provided  -R/B/SE's of medications discussed with the pt who expresses understanding and chooses to take medications as prescribed.   -Pt instructed to call clinic, 911 or go to nearest emergency room if sxs worsen or pt is in   crisis. The pt expresses understanding.    Waldemar Michele, NP

## 2022-12-08 ENCOUNTER — PATIENT MESSAGE (OUTPATIENT)
Dept: PSYCHIATRY | Facility: CLINIC | Age: 37
End: 2022-12-08
Payer: COMMERCIAL

## 2022-12-09 ENCOUNTER — OFFICE VISIT (OUTPATIENT)
Dept: PSYCHIATRY | Facility: CLINIC | Age: 37
End: 2022-12-09
Payer: COMMERCIAL

## 2022-12-09 DIAGNOSIS — F41.1 GAD (GENERALIZED ANXIETY DISORDER): ICD-10-CM

## 2022-12-09 DIAGNOSIS — F39 UNSPECIFIED MOOD (AFFECTIVE) DISORDER: Primary | ICD-10-CM

## 2022-12-09 PROCEDURE — 99214 OFFICE O/P EST MOD 30 MIN: CPT | Mod: 95,,, | Performed by: NURSE PRACTITIONER

## 2022-12-09 PROCEDURE — 1159F PR MEDICATION LIST DOCUMENTED IN MEDICAL RECORD: ICD-10-PCS | Mod: CPTII,95,, | Performed by: NURSE PRACTITIONER

## 2022-12-09 PROCEDURE — 1160F PR REVIEW ALL MEDS BY PRESCRIBER/CLIN PHARMACIST DOCUMENTED: ICD-10-PCS | Mod: CPTII,95,, | Performed by: NURSE PRACTITIONER

## 2022-12-09 PROCEDURE — 1159F MED LIST DOCD IN RCRD: CPT | Mod: CPTII,95,, | Performed by: NURSE PRACTITIONER

## 2022-12-09 PROCEDURE — 1160F RVW MEDS BY RX/DR IN RCRD: CPT | Mod: CPTII,95,, | Performed by: NURSE PRACTITIONER

## 2022-12-09 PROCEDURE — 99214 PR OFFICE/OUTPT VISIT, EST, LEVL IV, 30-39 MIN: ICD-10-PCS | Mod: 95,,, | Performed by: NURSE PRACTITIONER

## 2022-12-09 RX ORDER — BUPROPION HYDROCHLORIDE 150 MG/1
150 TABLET, EXTENDED RELEASE ORAL DAILY
Qty: 30 TABLET | Refills: 2 | Status: SHIPPED | OUTPATIENT
Start: 2022-12-09 | End: 2023-02-10

## 2022-12-09 NOTE — PROGRESS NOTES
Outpatient Psychiatry Follow-Up Visit    Clinical Status of Patient: Outpatient (Virtual)  12/09/2022     The patient location is:  [address] [place] [address]  [phone] (M)  The patient [place]  The patient phone number is: [phone]  Visit type: Virtual visit with synchronous audio and video  Each patient to whom he or she provides medical services by telemedicine is:  (1) informed of the relationship between the physician and patient and the respective role of any other health care provider with respect to management of the patient; and (2) notified that he or she may decline to receive medical services by telemedicine and may withdraw from such care at any time.    Chief Complaint: Pt is a 34 year old female who presents today for a follow-up. Met with patient.       Interval History and Content of Current Session:  Interim Events/Subjective Report/Content of Current Session: follow up appointment.    Pt is a 34 year old female with a hx of RENETTA, mood disorder NOS who presents for follow up treatment. She is currently taking Wellbutrin-SR 200mg QD. Current med regimen tolerated well and provide good symptomatic relief overall.     AT last visit, we restarted prozac 20mg qed per pt request to address worsening anxiety. Since restarting, she reports good tolerability but minimal efficacy. She reports inc'd fatigue and sluggishness. Also notes worse concentration. Mood is good, but notes periods of sadness -denies major decompensations of depression between visits.       Past Psychiatric hx: : Pt. is a 33 y/o female with a hx of RENETTA, mood disorder NOS who established care with me 2/19.. She has a history of miscarriage in 2017. She came to me taking Zoloft 100mg QD, which was prescribed by her OB-GYN following the birth of her second child on 09/03/2018, due to post-partum depression. She reports that this medication has possibly made her anxiety slightly worse. It has  "also made her "apathetic and emotionless." She reports that she would like to d/c the Zoloft and try something new. Previous med trials include Lexapro (prescribed at age 17 for anxiety, reports efficacy), Pristiq (was unsure if this worked due to concurrent use with Vyvanse), and Vyvanse.      She is currently taking Wellbutrin-SR 200mg QD, after a recent transition from Wellbutrin-XL. Pt has been adamant about monotherapy with Wellbutrin due to reading various sources and forums online. I have educated her that this med can potentially increase anxiety, and is better suited for treating depression. She states that she has been reading forums where people have reported better results with SR as opposed to XL. She then expressed her desire to be switched from XL to SR Since switching from XL to SR formulations, she reports a modest improvement in mood and anxiety levels. She also endorses "times where I just get happy" throughout the day. Fatigue has been problematic for her in the past, but she feels the Wellbutrin has helped with this. Pt states "I was just driving around the other day and told my , wow what a beautiful day. He was like wow, where did that come from?"    Pt has long experienced anxiety and characterizes herself as a "worrier". Following a miscarriage in 2017, her constant worry of herself getting cancer has now turned into worry about her children getting cancer. She endorses low motivation, poor energy, poor concentration, irritability, muscle tension.        Past Medical hx:   Past Medical History:   Diagnosis Date    ADD (attention deficit disorder)     Anxiety     Cystic hygroma     Diabetes mellitus     gdm        Interim hx:  Medication changes last visit: none  Anxiety: inc;d  Depression: denies     Denies suicidal/homicidal ideations.  Denies hopelessness/worthlessness.    Denies auditory/visual hallucinations      Alcohol: 4-5 nights weekly, one drink of liquor nightly  Drug: " denies  Caffeine: 1 cup coffee daily  Tobacco: denies      Review of Systems   PSYCHIATRIC: Pertinent items are noted in the narrative.        CONSTITUTIONAL: weight stable        M/S: no pain today         ENT: no allergies noted today        ABD: no n/v/d     Past Medical, Family and Social History: The patient's past medical, family and social history have been reviewed and updated as appropriate within the electronic medical record. See encounter notes.     Medication: Wellbutrin SR 200mg QD     Compliance: yes      Side effects: tolerates     Risk Parameters:  Patient reports no suicidal ideation  Patient reports no homicidal ideation  Patient reports no self-injurious behavior  Patient reports no violent behavior     Exam (detailed: at least 9 elements; comprehensive: all 15 elements)   Constitutional  Vitals:  Most recent vital signs, dated less than 90 days prior to this appointment, were reviewed. There were no vitals taken for this visit.     General:  unremarkable, age appropriate, casual attire, good eye contact, good rapport       Musculoskeletal  Muscle Strength/Tone:  no flaccidity, no tremor    Gait & Station:  normal      Psychiatric                       Speech:  normal tone, normal rate, rhythm, and volume   Mood & Affect:   Euthymic, congruent, appropriate         Thought Process:   Goal directed; Linear    Associations:   intact   Thought Content:   No SI/HI, delusions, or paranoia, no AV/VH   Insight & Judgement:   Good, adequate to circumstances   Orientation:   grossly intact; alert and oriented x 4    Memory:  intact for content of interview    Language:  grossly intact, can repeat    Attention Span  : Grossly intact for content of interview   Fund of Knowledge:   intact and appropriate to age and level of education        Assessment and Diagnosis   Status/Progress: Based on the examination today, the patient's problem(s) is/are under fair control.  New problems have not been presented  today. Comorbidities are not currently complicating management of the primary condition.      Impression:    t is a 34 year old female with a hx of RENETTA, mood disorder NOS who presents for follow up treatment. She is currently taking Wellbutrin-SR 200mg QD. Current med regimen tolerated well and provide good symptomatic relief overall.     AT last visit, we restarted prozac 20mg qed per pt request to address worsening anxiety. Since restarting, she reports good tolerability but minimal efficacy. She reports inc'd fatigue and sluggishness. Also notes worse concentration. Mood is good, but notes periods of sadness -denies major decompensations of depression between visits.     Diagnosis: RENETTA, mood disorder, unspecified    Intervention/Counseling/Treatment Plan   Medication Management:      Inc wellbutrin-sr to 150mg QD for mood.    2. Cont Prozac 20mg QD     2. Call to report any worsening of symptoms or problems with the medication. Pt instructed to go to ER with thoughts of harming self, others     3. Patient given contact # for psychotherapists at [facility] and also instructed she may check with insurance for list of providers.      4. Labs: no new orders       Return to clinic: 1 months - self schedule    -Spent 30min face to face with the pt; >50% time spent in counseling   -Supportive therapy and psychoeducation provided  -R/B/SE's of medications discussed with the pt who expresses understanding and chooses to take medications as prescribed.   -Pt instructed to call clinic, 911 or go to nearest emergency room if sxs worsen or pt is in   crisis. The pt expresses understanding.    Waldemar Michele, NP

## 2023-02-10 ENCOUNTER — OFFICE VISIT (OUTPATIENT)
Dept: PSYCHIATRY | Facility: CLINIC | Age: 38
End: 2023-02-10
Payer: COMMERCIAL

## 2023-02-10 DIAGNOSIS — F39 UNSPECIFIED MOOD (AFFECTIVE) DISORDER: ICD-10-CM

## 2023-02-10 DIAGNOSIS — F41.1 GAD (GENERALIZED ANXIETY DISORDER): Primary | ICD-10-CM

## 2023-02-10 PROCEDURE — 1159F PR MEDICATION LIST DOCUMENTED IN MEDICAL RECORD: ICD-10-PCS | Mod: CPTII,95,, | Performed by: NURSE PRACTITIONER

## 2023-02-10 PROCEDURE — 99214 PR OFFICE/OUTPT VISIT, EST, LEVL IV, 30-39 MIN: ICD-10-PCS | Mod: 95,,, | Performed by: NURSE PRACTITIONER

## 2023-02-10 PROCEDURE — 1160F RVW MEDS BY RX/DR IN RCRD: CPT | Mod: CPTII,95,, | Performed by: NURSE PRACTITIONER

## 2023-02-10 PROCEDURE — 1160F PR REVIEW ALL MEDS BY PRESCRIBER/CLIN PHARMACIST DOCUMENTED: ICD-10-PCS | Mod: CPTII,95,, | Performed by: NURSE PRACTITIONER

## 2023-02-10 PROCEDURE — 1159F MED LIST DOCD IN RCRD: CPT | Mod: CPTII,95,, | Performed by: NURSE PRACTITIONER

## 2023-02-10 PROCEDURE — 99214 OFFICE O/P EST MOD 30 MIN: CPT | Mod: 95,,, | Performed by: NURSE PRACTITIONER

## 2023-02-10 RX ORDER — BUPROPION HYDROCHLORIDE 200 MG/1
200 TABLET, EXTENDED RELEASE ORAL DAILY
Qty: 30 TABLET | Refills: 2 | Status: SHIPPED | OUTPATIENT
Start: 2023-02-10 | End: 2023-05-11

## 2023-02-10 NOTE — PROGRESS NOTES
"Outpatient Psychiatry Follow-Up Visit    Clinical Status of Patient: Outpatient (Virtual)  02/10/2023     The patient location is:  [address] [place] [address]  [phone] (M)  The patient location [place] is: [facility]  The patient phone number is: [phone]  Visit type: Virtual visit with synchronous audio and video  Each patient to whom he or she provides medical services by telemedicine is:  (1) informed of the relationship between the physician and patient and the respective role of any other health care provider with respect to management of the patient; and (2) notified that he or she may decline to receive medical services by telemedicine and may withdraw from such care at any time.    Chief Complaint: Pt is a 37 year old female who presents today for a follow-up. Met with patient.       Interval History and Content of Current Session:  Interim Events/Subjective Report/Content of Current Session: follow up appointment.    Pt is a 37 year old female with a hx of RENETTA, mood disorder NOS who presents for follow up treatment. She is currently taking Wellbutrin-SR 150mg QD and prozac 20ng qd. Current med regimen tolerated well and provide good symptomatic relief overall.     Since last visit, pt notes she has still been struggling with both generalized and ruminative worrying. She continues to feel an overall lack of motivation and apathy. Sleeping well. Pt stable but does feel her fatigue needs to addressed.      Past Psychiatric hx: : Pt. is a 35 y/o female with a hx of RENETTA, mood disorder NOS who established care with me 2/19.. She has a history of miscarriage in 2017. She came to me taking Zoloft 100mg QD, which was prescribed by her OB-GYN following the birth of her second child on 09/03/2018, due to post-partum depression. She reports that this medication has possibly made her anxiety slightly worse. It has also made her "apathetic and emotionless." She reports that she would like to d/c " "the Zoloft and try something new. Previous med trials include Lexapro (prescribed at age 17 for anxiety, reports efficacy), Pristiq (was unsure if this worked due to concurrent use with Vyvanse), and Vyvanse.      She is currently taking Wellbutrin-SR 200mg QD, after a recent transition from Wellbutrin-XL. Pt has been adamant about monotherapy with Wellbutrin due to reading various sources and forums online. I have educated her that this med can potentially increase anxiety, and is better suited for treating depression. She states that she has been reading forums where people have reported better results with SR as opposed to XL. She then expressed her desire to be switched from XL to SR Since switching from XL to SR formulations, she reports a modest improvement in mood and anxiety levels. She also endorses "times where I just get happy" throughout the day. Fatigue has been problematic for her in the past, but she feels the Wellbutrin has helped with this. Pt states "I was just driving around the other day and told my , wow what a beautiful day. He was like wow, where did that come from?"    Pt has long experienced anxiety and characterizes herself as a "worrier". Following a miscarriage in 2017, her constant worry of herself getting cancer has now turned into worry about her children getting cancer. She endorses low motivation, poor energy, poor concentration, irritability, muscle tension.        Past Medical hx:   Past Medical History:   Diagnosis Date    ADD (attention deficit disorder)     Anxiety     Cystic hygroma     Diabetes mellitus     gdm        Interim hx:  Medication changes last visit: none  Anxiety: inc;d  Depression: denies     Denies suicidal/homicidal ideations.  Denies hopelessness/worthlessness.    Denies auditory/visual hallucinations      Alcohol: 4-5 nights weekly, one drink of liquor nightly  Drug: denies  Caffeine: 1 cup coffee daily  Tobacco: denies      Review of Systems "   PSYCHIATRIC: Pertinent items are noted in the narrative.        CONSTITUTIONAL: weight stable        M/S: no pain today         ENT: no allergies noted today        ABD: no n/v/d     Past Medical, Family and Social History: The patient's past medical, family and social history have been reviewed and updated as appropriate within the electronic medical record. See encounter notes.     Medication: Wellbutrin SR 200mg QD     Compliance: yes      Side effects: tolerates     Risk Parameters:  Patient reports no suicidal ideation  Patient reports no homicidal ideation  Patient reports no self-injurious behavior  Patient reports no violent behavior     Exam (detailed: at least 9 elements; comprehensive: all 15 elements)   Constitutional  Vitals:  Most recent vital signs, dated less than 90 days prior to this appointment, were reviewed. There were no vitals taken for this visit.     General:  unremarkable, age appropriate, casual attire, good eye contact, good rapport       Musculoskeletal  Muscle Strength/Tone:  no flaccidity, no tremor    Gait & Station:  normal      Psychiatric                       Speech:  normal tone, normal rate, rhythm, and volume   Mood & Affect:   Euthymic, congruent, appropriate         Thought Process:   Goal directed; Linear    Associations:   intact   Thought Content:   No SI/HI, delusions, or paranoia, no AV/VH   Insight & Judgement:   Good, adequate to circumstances   Orientation:   grossly intact; alert and oriented x 4    Memory:  intact for content of interview    Language:  grossly intact, can repeat    Attention Span  : Grossly intact for content of interview   Fund of Knowledge:   intact and appropriate to age and level of education        Assessment and Diagnosis   Status/Progress: Based on the examination today, the patient's problem(s) is/are under fair control.  New problems have not been presented today. Comorbidities are not currently complicating management of the primary  condition.      Impression:      Pt is a 37 year old female with a hx of RENETTA, mood disorder NOS who presents for follow up treatment. She is currently taking Wellbutrin-SR 150mg QD and prozac 20ng qd. Current med regimen tolerated well and provide good symptomatic relief overall.     Since last visit, pt notes she has still been struggling with both generalized and ruminative worrying. She continues to feel an overall lack of motivation and apathy. Sleeping well. Pt stable but does feel her fatigue needs to addressed.    Diagnosis: RENETTA, mood disorder, unspecified    Intervention/Counseling/Treatment Plan   Medication Management:      Inc wellbutrin-sr to 200mg QD for mood.    2. Cont Prozac 20mg QD     2. Call to report any worsening of symptoms or problems with the medication. Pt instructed to go to ER with thoughts of harming self, others     3. Patient given contact # for psychotherapists at [facility] and also instructed she may check with insurance for list of providers.      4. Labs: no new orders       Return to clinic: 1 months - self schedule    -Spent 30min face to face with the pt; >50% time spent in counseling   -Supportive therapy and psychoeducation provided  -R/B/SE's of medications discussed with the pt who expresses understanding and chooses to take medications as prescribed.   -Pt instructed to call clinic, 911 or go to nearest emergency room if sxs worsen or pt is in   crisis. The pt expresses understanding.    Waldemar Michele, NP

## 2023-03-17 ENCOUNTER — TELEPHONE (OUTPATIENT)
Dept: PSYCHIATRY | Facility: CLINIC | Age: 38
End: 2023-03-17
Payer: COMMERCIAL

## 2023-03-17 NOTE — TELEPHONE ENCOUNTER
Spoke with pt and confirmed her virtual psychiatry appointment scheduled for 3/21/23 at 1 pm with Waldemar Michele. Pt verbalized understanding.

## 2023-03-21 ENCOUNTER — OFFICE VISIT (OUTPATIENT)
Dept: PSYCHIATRY | Facility: CLINIC | Age: 38
End: 2023-03-21
Payer: COMMERCIAL

## 2023-03-21 DIAGNOSIS — F39 UNSPECIFIED MOOD (AFFECTIVE) DISORDER: Primary | ICD-10-CM

## 2023-03-21 DIAGNOSIS — F41.1 GAD (GENERALIZED ANXIETY DISORDER): ICD-10-CM

## 2023-03-21 PROCEDURE — 99214 OFFICE O/P EST MOD 30 MIN: CPT | Mod: 95,,, | Performed by: NURSE PRACTITIONER

## 2023-03-21 PROCEDURE — 99214 PR OFFICE/OUTPT VISIT, EST, LEVL IV, 30-39 MIN: ICD-10-PCS | Mod: 95,,, | Performed by: NURSE PRACTITIONER

## 2023-03-21 PROCEDURE — 1159F PR MEDICATION LIST DOCUMENTED IN MEDICAL RECORD: ICD-10-PCS | Mod: CPTII,95,, | Performed by: NURSE PRACTITIONER

## 2023-03-21 PROCEDURE — 1160F RVW MEDS BY RX/DR IN RCRD: CPT | Mod: CPTII,95,, | Performed by: NURSE PRACTITIONER

## 2023-03-21 PROCEDURE — 1159F MED LIST DOCD IN RCRD: CPT | Mod: CPTII,95,, | Performed by: NURSE PRACTITIONER

## 2023-03-21 PROCEDURE — 1160F PR REVIEW ALL MEDS BY PRESCRIBER/CLIN PHARMACIST DOCUMENTED: ICD-10-PCS | Mod: CPTII,95,, | Performed by: NURSE PRACTITIONER

## 2023-03-21 RX ORDER — FLUOXETINE 10 MG/1
10 CAPSULE ORAL DAILY
Qty: 30 CAPSULE | Refills: 3 | Status: SHIPPED | OUTPATIENT
Start: 2023-03-21 | End: 2023-03-24

## 2023-03-21 NOTE — PROGRESS NOTES
Outpatient Psychiatry Follow-Up Visit    Clinical Status of Patient: Outpatient (Virtual)  03/21/2023     The patient location is:  [address] [place] [address]  [phone] (M)  The patient location [place] is: [facility]  The patient phone number is: [phone]  Visit type: Virtual visit with synchronous audio and video  Each patient to whom he or she provides medical services by telemedicine is:  (1) informed of the relationship between the physician and patient and the respective role of any other health care provider with respect to management of the patient; and (2) notified that he or she may decline to receive medical services by telemedicine and may withdraw from such care at any time.    Chief Complaint: Pt is a 37 year old female who presents today for a follow-up. Met with patient.       Interval History and Content of Current Session:  Interim Events/Subjective Report/Content of Current Session: follow up appointment.    Pt is a 37 year old female with a hx of RENETTA, mood disorder NOS who presents for follow up treatment. She is currently taking Wellbutrin-SR 200mg QD and prozac 20ng qd. Current med regimen tolerated well and provide good symptomatic relief overall.     Wellbutrin was inc'd by 50mg daily last session to address ongoing fatigue/sluggishness. Today, pt notes minimal improvements in these areas. She notes struggles with generalized anxiety and bouts of irritability. Notes periods of anhedonia and apathy as well. Pt does note that she self-decreased her Prozac to 10mg daily but unable to determine why exactly. Sleeping fine. Pt is generally resistant to med changes and would like to conitnue current med regimen. Pt is stable and high funcitoning overall.       Past Psychiatric hx: : Pt. is a 33 y/o female with a hx of RENETTA, mood disorder NOS who established care with me 2/19.. She has a history of miscarriage in 2017. She came to me taking Zoloft 100mg QD, which was prescribed  "by her OB-GYN following the birth of her second child on 09/03/2018, due to post-partum depression. She reports that this medication has possibly made her anxiety slightly worse. It has also made her "apathetic and emotionless." She reports that she would like to d/c the Zoloft and try something new. Previous med trials include Lexapro (prescribed at age 17 for anxiety, reports efficacy), Pristiq (was unsure if this worked due to concurrent use with Vyvanse), and Vyvanse.      She is currently taking Wellbutrin-SR 200mg QD, after a recent transition from Wellbutrin-XL. Pt has been adamant about monotherapy with Wellbutrin due to reading various sources and forums online. I have educated her that this med can potentially increase anxiety, and is better suited for treating depression. She states that she has been reading forums where people have reported better results with SR as opposed to XL. She then expressed her desire to be switched from XL to SR Since switching from XL to SR formulations, she reports a modest improvement in mood and anxiety levels. She also endorses "times where I just get happy" throughout the day. Fatigue has been problematic for her in the past, but she feels the Wellbutrin has helped with this. Pt states "I was just driving around the other day and told my , wow what a beautiful day. He was like wow, where did that come from?"    Pt has long experienced anxiety and characterizes herself as a "worrier". Following a miscarriage in 2017, her constant worry of herself getting cancer has now turned into worry about her children getting cancer. She endorses low motivation, poor energy, poor concentration, irritability, muscle tension.        Past Medical hx:   Past Medical History:   Diagnosis Date    ADD (attention deficit disorder)     Anxiety     Cystic hygroma     Diabetes mellitus     gdm        Interim hx:  Medication changes last visit: none  Anxiety: inc;d  Depression: denies   "   Denies suicidal/homicidal ideations.  Denies hopelessness/worthlessness.    Denies auditory/visual hallucinations      Alcohol: 4-5 nights weekly, one drink of liquor nightly  Drug: denies  Caffeine: 1 cup coffee daily  Tobacco: denies      Review of Systems   PSYCHIATRIC: Pertinent items are noted in the narrative.        CONSTITUTIONAL: weight stable        M/S: no pain today         ENT: no allergies noted today        ABD: no n/v/d     Past Medical, Family and Social History: The patient's past medical, family and social history have been reviewed and updated as appropriate within the electronic medical record. See encounter notes.     Medication: Wellbutrin SR 200mg QD     Compliance: yes      Side effects: tolerates     Risk Parameters:  Patient reports no suicidal ideation  Patient reports no homicidal ideation  Patient reports no self-injurious behavior  Patient reports no violent behavior     Exam (detailed: at least 9 elements; comprehensive: all 15 elements)   Constitutional  Vitals:  Most recent vital signs, dated less than 90 days prior to this appointment, were reviewed. There were no vitals taken for this visit.     General:  unremarkable, age appropriate, casual attire, good eye contact, good rapport       Musculoskeletal  Muscle Strength/Tone:  no flaccidity, no tremor    Gait & Station:  normal      Psychiatric                       Speech:  normal tone, normal rate, rhythm, and volume   Mood & Affect:   Euthymic, congruent, appropriate         Thought Process:   Goal directed; Linear    Associations:   intact   Thought Content:   No SI/HI, delusions, or paranoia, no AV/VH   Insight & Judgement:   Good, adequate to circumstances   Orientation:   grossly intact; alert and oriented x 4    Memory:  intact for content of interview    Language:  grossly intact, can repeat    Attention Span  : Grossly intact for content of interview   Fund of Knowledge:   intact and appropriate to age and level of  education        Assessment and Diagnosis   Status/Progress: Based on the examination today, the patient's problem(s) is/are under fair control.  New problems have not been presented today. Comorbidities are not currently complicating management of the primary condition.      Impression:        Pt is a 37 year old female with a hx of RENETTA, mood disorder NOS who presents for follow up treatment. She is currently taking Wellbutrin-SR 200mg QD and prozac 20ng qd. Current med regimen tolerated well and provide good symptomatic relief overall.     Wellbutrin was inc'd by 50mg daily last session to address ongoing fatigue/sluggishness. Today, pt notes minimal improvements in these areas. She notes struggles with generalized anxiety and bouts of irritability. Notes periods of anhedonia and apathy as well. Pt does note that she self-decreased her Prozac to 10mg daily but unable to determine why exactly. Sleeping fine. Pt is generally resistant to med changes and would like to conitnue current med regimen. Pt is stable and high funcitoning overall.     Diagnosis: RENETTA, mood disorder, unspecified    Intervention/Counseling/Treatment Plan   Medication Management:      Continue Wellbutrin SR 200mg QD for mood.    2. Cont Prozac 10mg QD     2. Call to report any worsening of symptoms or problems with the medication. Pt instructed to go to ER with thoughts of harming self, others     3. Patient given contact # for psychotherapists at [facility] and also instructed she may check with insurance for list of providers.      4. Labs: no new orders       Return to clinic: 3 months - self schedule    -Spent 30min face to face with the pt; >50% time spent in counseling   -Supportive therapy and psychoeducation provided  -R/B/SE's of medications discussed with the pt who expresses understanding and chooses to take medications as prescribed.   -Pt instructed to call clinic, 911 or go to nearest emergency room if sxs worsen or pt is in    crisis. The pt expresses understanding.    Waldemar Michele, NP

## 2023-03-23 ENCOUNTER — PATIENT MESSAGE (OUTPATIENT)
Dept: PSYCHIATRY | Facility: CLINIC | Age: 38
End: 2023-03-23
Payer: COMMERCIAL

## 2023-03-24 RX ORDER — FLUOXETINE 10 MG/1
10 TABLET ORAL DAILY
Qty: 30 TABLET | Refills: 2 | Status: SHIPPED | OUTPATIENT
Start: 2023-03-24 | End: 2023-09-08

## 2023-05-08 ENCOUNTER — TELEPHONE (OUTPATIENT)
Dept: PSYCHIATRY | Facility: CLINIC | Age: 38
End: 2023-05-08
Payer: COMMERCIAL

## 2023-05-11 RX ORDER — BUPROPION HYDROCHLORIDE 200 MG/1
TABLET, EXTENDED RELEASE ORAL
Qty: 90 TABLET | Refills: 0 | Status: SHIPPED | OUTPATIENT
Start: 2023-05-11 | End: 2023-09-08

## 2023-09-08 ENCOUNTER — OFFICE VISIT (OUTPATIENT)
Dept: PSYCHIATRY | Facility: CLINIC | Age: 38
End: 2023-09-08
Payer: COMMERCIAL

## 2023-09-08 ENCOUNTER — TELEPHONE (OUTPATIENT)
Dept: PSYCHIATRY | Facility: CLINIC | Age: 38
End: 2023-09-08
Payer: COMMERCIAL

## 2023-09-08 DIAGNOSIS — F41.1 GAD (GENERALIZED ANXIETY DISORDER): Primary | ICD-10-CM

## 2023-09-08 DIAGNOSIS — F39 UNSPECIFIED MOOD (AFFECTIVE) DISORDER: ICD-10-CM

## 2023-09-08 PROCEDURE — 99214 OFFICE O/P EST MOD 30 MIN: CPT | Mod: 95,,, | Performed by: NURSE PRACTITIONER

## 2023-09-08 PROCEDURE — 1159F PR MEDICATION LIST DOCUMENTED IN MEDICAL RECORD: ICD-10-PCS | Mod: CPTII,95,, | Performed by: NURSE PRACTITIONER

## 2023-09-08 PROCEDURE — 99214 PR OFFICE/OUTPT VISIT, EST, LEVL IV, 30-39 MIN: ICD-10-PCS | Mod: 95,,, | Performed by: NURSE PRACTITIONER

## 2023-09-08 PROCEDURE — 1159F MED LIST DOCD IN RCRD: CPT | Mod: CPTII,95,, | Performed by: NURSE PRACTITIONER

## 2023-09-08 PROCEDURE — 1160F PR REVIEW ALL MEDS BY PRESCRIBER/CLIN PHARMACIST DOCUMENTED: ICD-10-PCS | Mod: CPTII,95,, | Performed by: NURSE PRACTITIONER

## 2023-09-08 PROCEDURE — 1160F RVW MEDS BY RX/DR IN RCRD: CPT | Mod: CPTII,95,, | Performed by: NURSE PRACTITIONER

## 2023-09-08 RX ORDER — BUPROPION HYDROCHLORIDE 150 MG/1
150 TABLET, EXTENDED RELEASE ORAL DAILY
Qty: 30 TABLET | Refills: 6 | Status: SHIPPED | OUTPATIENT
Start: 2023-09-08 | End: 2024-03-04

## 2023-09-08 NOTE — PROGRESS NOTES
Outpatient Psychiatry Follow-Up Visit    Clinical Status of Patient: Outpatient (Virtual)  09/08/2023     The patient location is:  [address] [place] [address]  [phone] (M)  The patient location [place] is: [facility]  The patient phone number is: [phone]  Visit type: Virtual visit with synchronous audio and video  Each patient to whom he or she provides medical services by telemedicine is:  (1) informed of the relationship between the physician and patient and the respective role of any other health care provider with respect to management of the patient; and (2) notified that he or she may decline to receive medical services by telemedicine and may withdraw from such care at any time.    Chief Complaint: Pt is a 37 year old female who presents today for a follow-up. Met with patient.       Interval History and Content of Current Session:  Interim Events/Subjective Report/Content of Current Session: follow up appointment.    Pt is a 38 year old female with a hx of RENETTA, mood disorder NOS who presents for follow up treatment. She is currently taking Wellbutrin-SR 200mg QD and prozac 10ng qd. Current med regimen tolerated well and provide good symptomatic relief overall.     Today, pt reports that she self d/c Prozac, again without my guidance. I have had continued discussions with patient about not making med changes on her own. In addition she requests to reduce her dose of Wellbutrin to 150mg QD. Pt reports a good mood and is not overtly depressed today. She does note ongoing anxiety and irritability, notably poor frustration tolerance with her family. She denies generalized or ruminative worry. She is sleeping well with a normal appetite.       Past Psychiatric hx: : Pt. is a 33 y/o female with a hx of RENETTA, mood disorder NOS who established care with me 2/19.. She has a history of miscarriage in 2017. She came to me taking Zoloft 100mg QD, which was prescribed by her OB-GYN following the  "birth of her second child on 09/03/2018, due to post-partum depression. She reports that this medication has possibly made her anxiety slightly worse. It has also made her "apathetic and emotionless." She reports that she would like to d/c the Zoloft and try something new. Previous med trials include Lexapro (prescribed at age 17 for anxiety, reports efficacy), Pristiq (was unsure if this worked due to concurrent use with Vyvanse), and Vyvanse.      She is currently taking Wellbutrin-SR 200mg QD, after a recent transition from Wellbutrin-XL. Pt has been adamant about monotherapy with Wellbutrin due to reading various sources and forums online. I have educated her that this med can potentially increase anxiety, and is better suited for treating depression. She states that she has been reading forums where people have reported better results with SR as opposed to XL. She then expressed her desire to be switched from XL to SR Since switching from XL to SR formulations, she reports a modest improvement in mood and anxiety levels. She also endorses "times where I just get happy" throughout the day. Fatigue has been problematic for her in the past, but she feels the Wellbutrin has helped with this. Pt states "I was just driving around the other day and told my , wow what a beautiful day. He was like wow, where did that come from?"    Pt has long experienced anxiety and characterizes herself as a "worrier". Following a miscarriage in 2017, her constant worry of herself getting cancer has now turned into worry about her children getting cancer. She endorses low motivation, poor energy, poor concentration, irritability, muscle tension.        Past Medical hx:   Past Medical History:   Diagnosis Date    ADD (attention deficit disorder)     Anxiety     Cystic hygroma     Diabetes mellitus     gdm        Interim hx:  Medication changes last visit: none  Anxiety: inc;d  Depression: denies     Denies suicidal/homicidal " ideations.  Denies hopelessness/worthlessness.    Denies auditory/visual hallucinations      Alcohol: 4-5 nights weekly, one drink of liquor nightly  Drug: denies  Caffeine: 1 cup coffee daily  Tobacco: denies      Review of Systems   PSYCHIATRIC: Pertinent items are noted in the narrative.        CONSTITUTIONAL: weight stable        M/S: no pain today         ENT: no allergies noted today        ABD: no n/v/d     Past Medical, Family and Social History: The patient's past medical, family and social history have been reviewed and updated as appropriate within the electronic medical record. See encounter notes.     Medication: Wellbutrin SR 200mg QD     Compliance: yes      Side effects: tolerates     Risk Parameters:  Patient reports no suicidal ideation  Patient reports no homicidal ideation  Patient reports no self-injurious behavior  Patient reports no violent behavior     Exam (detailed: at least 9 elements; comprehensive: all 15 elements)   Constitutional  Vitals:  Most recent vital signs, dated less than 90 days prior to this appointment, were reviewed. There were no vitals taken for this visit.     General:  unremarkable, age appropriate, casual attire, good eye contact, good rapport       Musculoskeletal  Muscle Strength/Tone:  no flaccidity, no tremor    Gait & Station:  normal      Psychiatric                       Speech:  normal tone, normal rate, rhythm, and volume   Mood & Affect:   Euthymic, congruent, appropriate         Thought Process:   Goal directed; Linear    Associations:   intact   Thought Content:   No SI/HI, delusions, or paranoia, no AV/VH   Insight & Judgement:   Good, adequate to circumstances   Orientation:   grossly intact; alert and oriented x 4    Memory:  intact for content of interview    Language:  grossly intact, can repeat    Attention Span  : Grossly intact for content of interview   Fund of Knowledge:   intact and appropriate to age and level of education        Assessment and  Diagnosis   Status/Progress: Based on the examination today, the patient's problem(s) is/are under fair control.  New problems have not been presented today. Comorbidities are not currently complicating management of the primary condition.      Impression:      Pt is a 37 year old female with a hx of RENETTA, mood disorder NOS who presents for follow up treatment. She is currently taking Wellbutrin-SR 200mg QD and prozac 10ng qd. Current med regimen tolerated well and provide good symptomatic relief overall.     Today, pt reports that she self d/c Prozac, again without my guidance. I have had continued discussions with patient about not making med changes on her own. In addition she requests to reduce her dose of Wellbutrin to 150mg QD. Pt reports a good mood and is not overtly depressed today. She does note ongoing anxiety and irritability, notably poor frustration tolerance with her family. She denies generalized or ruminative worry. She is sleeping well with a normal appetite.         Diagnosis: RENETTA, mood disorder, unspecified    Intervention/Counseling/Treatment Plan   Medication Management:      Dec Wellbutrin-SR to 150mg qd per pt request  hi   2. Call to report any worsening of symptoms or problems with the medication. Pt instructed to go to ER with thoughts of harming self, others     3. Patient given contact # for psychotherapists at [facility] and also instructed she may check with insurance for list of providers.      4. Labs: no new orders       Return to clinic: 6 months - self schedule    -Spent 30min face to face with the pt; >50% time spent in counseling   -Supportive therapy and psychoeducation provided  -R/B/SE's of medications discussed with the pt who expresses understanding and chooses to take medications as prescribed.   -Pt instructed to call clinic, 911 or go to nearest emergency room if sxs worsen or pt is in   crisis. The pt expresses understanding.    Waldemar Michele,  NP

## 2024-02-21 ENCOUNTER — PATIENT MESSAGE (OUTPATIENT)
Dept: PSYCHIATRY | Facility: CLINIC | Age: 39
End: 2024-02-21
Payer: COMMERCIAL

## 2024-03-04 ENCOUNTER — OFFICE VISIT (OUTPATIENT)
Dept: PSYCHIATRY | Facility: CLINIC | Age: 39
End: 2024-03-04
Payer: COMMERCIAL

## 2024-03-04 VITALS
HEIGHT: 65 IN | WEIGHT: 124.13 LBS | SYSTOLIC BLOOD PRESSURE: 121 MMHG | DIASTOLIC BLOOD PRESSURE: 75 MMHG | HEART RATE: 67 BPM | BODY MASS INDEX: 20.68 KG/M2

## 2024-03-04 DIAGNOSIS — F39 UNSPECIFIED MOOD (AFFECTIVE) DISORDER: Primary | ICD-10-CM

## 2024-03-04 DIAGNOSIS — F41.1 GAD (GENERALIZED ANXIETY DISORDER): ICD-10-CM

## 2024-03-04 PROCEDURE — 99999 PR PBB SHADOW E&M-EST. PATIENT-LVL III: CPT | Mod: PBBFAC,,, | Performed by: NURSE PRACTITIONER

## 2024-03-04 PROCEDURE — 3078F DIAST BP <80 MM HG: CPT | Mod: CPTII,S$GLB,, | Performed by: NURSE PRACTITIONER

## 2024-03-04 PROCEDURE — 1159F MED LIST DOCD IN RCRD: CPT | Mod: CPTII,S$GLB,, | Performed by: NURSE PRACTITIONER

## 2024-03-04 PROCEDURE — 99214 OFFICE O/P EST MOD 30 MIN: CPT | Mod: S$GLB,,, | Performed by: NURSE PRACTITIONER

## 2024-03-04 PROCEDURE — 3074F SYST BP LT 130 MM HG: CPT | Mod: CPTII,S$GLB,, | Performed by: NURSE PRACTITIONER

## 2024-03-04 PROCEDURE — 3008F BODY MASS INDEX DOCD: CPT | Mod: CPTII,S$GLB,, | Performed by: NURSE PRACTITIONER

## 2024-03-04 PROCEDURE — 1160F RVW MEDS BY RX/DR IN RCRD: CPT | Mod: CPTII,S$GLB,, | Performed by: NURSE PRACTITIONER

## 2024-03-04 RX ORDER — BUPROPION HYDROCHLORIDE 100 MG/1
100 TABLET, EXTENDED RELEASE ORAL 2 TIMES DAILY
Qty: 60 TABLET | Refills: 2 | Status: SHIPPED | OUTPATIENT
Start: 2024-03-04 | End: 2025-03-04

## 2024-03-04 NOTE — PROGRESS NOTES
"  Outpatient Psychiatry Follow-Up Visit    Clinical Status of Patient: Outpatient (Ambulatory)  03/04/2024     Chief Complaint: Pt is a 38 year old female who presents today for a follow-up. Met with patient.       Interval History and Content of Current Session:  Interim Events/Subjective Report/Content of Current Session: follow up appointment.    Pt is a 38 year old female with a hx of RENETTA, mood disorder NOS who presents for follow up treatment. She is currently taking Wellbutrin-SR 150mg QD, which has been therapeutic and well-tolerated. Pt defers treatment with SSRIs.    Since last session, pt notes that she is stable and doing well. Her mood is good, not overtly depressed but does not fatigue, apathy, and lack of motivation. She would like to increase her Wellbutrin-SR. Her anxiety is well-managed but she does not patterns of irritability. She denies excessive or unproductive worry. Her appetite is normal - follows a low carb diet and exercises regularly. She is sleeping well.       Past Psychiatric hx: : Pt. is a 35 y/o female with a hx of RENETTA, mood disorder NOS who established care with me 2/19.. She has a history of miscarriage in 2017. She came to me taking Zoloft 100mg QD, which was prescribed by her OB-GYN following the birth of her second child on 09/03/2018, due to post-partum depression. She reports that this medication has possibly made her anxiety slightly worse. It has also made her "apathetic and emotionless." She reports that she would like to d/c the Zoloft and try something new. Previous med trials include Lexapro (prescribed at age 17 for anxiety, reports efficacy), Pristiq (was unsure if this worked due to concurrent use with Vyvanse), and Vyvanse.      She is currently taking Wellbutrin-SR 200mg QD, after a recent transition from Wellbutrin-XL. Pt has been adamant about monotherapy with Wellbutrin due to reading various sources and forums online. I have educated her that this med can " "potentially increase anxiety, and is better suited for treating depression. She states that she has been reading forums where people have reported better results with SR as opposed to XL. She then expressed her desire to be switched from XL to SR Since switching from XL to SR formulations, she reports a modest improvement in mood and anxiety levels. She also endorses "times where I just get happy" throughout the day. Fatigue has been problematic for her in the past, but she feels the Wellbutrin has helped with this. Pt states "I was just driving around the other day and told my , wow what a beautiful day. He was like wow, where did that come from?"    Pt has long experienced anxiety and characterizes herself as a "worrier". Following a miscarriage in 2017, her constant worry of herself getting cancer has now turned into worry about her children getting cancer. She endorses low motivation, poor energy, poor concentration, irritability, muscle tension.        Past Medical hx:   Past Medical History:   Diagnosis Date    ADD (attention deficit disorder)     Anxiety     Cystic hygroma     Diabetes mellitus     gdm        Interim hx:  Medication changes last visit: none  Anxiety: inc;d  Depression: denies     Denies suicidal/homicidal ideations.  Denies hopelessness/worthlessness.    Denies auditory/visual hallucinations      Alcohol: 4-5 nights weekly, one drink of liquor nightly  Drug: denies  Caffeine: 1 cup coffee daily  Tobacco: denies      Review of Systems   PSYCHIATRIC: Pertinent items are noted in the narrative.        CONSTITUTIONAL: weight stable        M/S: no pain today         ENT: no allergies noted today        ABD: no n/v/d     Past Medical, Family and Social History: The patient's past medical, family and social history have been reviewed and updated as appropriate within the electronic medical record. See encounter notes.     Medication: Wellbutrin SR 200mg QD     Compliance: yes      Side " effects: tolerates     Risk Parameters:  Patient reports no suicidal ideation  Patient reports no homicidal ideation  Patient reports no self-injurious behavior  Patient reports no violent behavior     Exam (detailed: at least 9 elements; comprehensive: all 15 elements)   Constitutional  Vitals:  Most recent vital signs, dated less than 90 days prior to this appointment, were reviewed. There were no vitals taken for this visit.     General:  unremarkable, age appropriate, casual attire, good eye contact, good rapport       Musculoskeletal  Muscle Strength/Tone:  no flaccidity, no tremor    Gait & Station:  normal      Psychiatric                       Speech:  normal tone, normal rate, rhythm, and volume   Mood & Affect:   Euthymic, congruent, appropriate         Thought Process:   Goal directed; Linear    Associations:   intact   Thought Content:   No SI/HI, delusions, or paranoia, no AV/VH   Insight & Judgement:   Good, adequate to circumstances   Orientation:   grossly intact; alert and oriented x 4    Memory:  intact for content of interview    Language:  grossly intact, can repeat    Attention Span  : Grossly intact for content of interview   Fund of Knowledge:   intact and appropriate to age and level of education        Assessment and Diagnosis   Status/Progress: Based on the examination today, the patient's problem(s) is/are under fair control.  New problems have not been presented today. Comorbidities are not currently complicating management of the primary condition.      Impression:    Pt is a 38 year old female with a hx of RENETTA, mood disorder NOS who presents for follow up treatment. She is currently taking Wellbutrin-SR 150mg QD, which has been therapeutic and well-tolerated. Pt defers treatment with SSRIs.    Since last session, pt notes that she is stable and doing well. Her mood is good, not overtly depressed but does not fatigue, apathy, and lack of motivation. She would like to increase her  Wellbutrin-SR. Her anxiety is well-managed but she does not patterns of irritability. She denies excessive or unproductive worry. Her appetite is normal - follows a low carb diet and exercises regularly. She is sleeping well.       Diagnosis: RENETTA, mood disorder, unspecified    Intervention/Counseling/Treatment Plan   Medication Management:      1) Inc Wellbutrin SR to 100mg BID    2. Call to report any worsening of symptoms or problems with the medication. Pt instructed to go to ER with thoughts of harming self, others     3. Patient given contact # for psychotherapists at [facility] and also instructed she may check with insurance for list of providers.      4. Labs: no new orders       Return to clinic: 6 months - self schedule    -Spent 30min face to face with the pt; >50% time spent in counseling   -Supportive therapy and psychoeducation provided  -R/B/SE's of medications discussed with the pt who expresses understanding and chooses to take medications as prescribed.   -Pt instructed to call clinic, 911 or go to nearest emergency room if sxs worsen or pt is in   crisis. The pt expresses understanding.    Waldemar Michele, NP

## 2024-07-08 RX ORDER — BUPROPION HYDROCHLORIDE 100 MG/1
100 TABLET, EXTENDED RELEASE ORAL 2 TIMES DAILY
Qty: 180 TABLET | Refills: 5 | Status: SHIPPED | OUTPATIENT
Start: 2024-07-08

## 2024-09-06 ENCOUNTER — PATIENT MESSAGE (OUTPATIENT)
Dept: PSYCHIATRY | Facility: CLINIC | Age: 39
End: 2024-09-06
Payer: COMMERCIAL

## 2024-09-06 ENCOUNTER — OFFICE VISIT (OUTPATIENT)
Dept: PSYCHIATRY | Facility: CLINIC | Age: 39
End: 2024-09-06
Payer: COMMERCIAL

## 2024-09-06 DIAGNOSIS — F41.1 GAD (GENERALIZED ANXIETY DISORDER): ICD-10-CM

## 2024-09-06 DIAGNOSIS — F39 UNSPECIFIED MOOD (AFFECTIVE) DISORDER: Primary | ICD-10-CM

## 2024-09-06 RX ORDER — BUPROPION HYDROCHLORIDE 150 MG/1
150 TABLET, EXTENDED RELEASE ORAL DAILY
Qty: 30 TABLET | Refills: 5 | Status: SHIPPED | OUTPATIENT
Start: 2024-09-06 | End: 2025-09-06

## 2024-09-06 NOTE — PROGRESS NOTES
Outpatient Psychiatry Follow-Up Visit    Clinical Status of Patient: Outpatient (Virtual)  09/06/2024   [address] [facility] [address]     [phone] (M)    Visit type: Virtual visit with synchronous audio and video  Each patient to whom he or she provides medical services by telemedicine is:  (1) informed of the relationship between the physician and patient and the respective role of any other health care provider with respect to management of the patient; and (2) notified that he or she may decline to receive medical services by telemedicine and may withdraw from such care at any time.    Chief Complaint: Pt is a 38 year old female who presents today for a follow-up. Met with patient.       Interval History and Content of Current Session:  Interim Events/Subjective Report/Content of Current Session: follow up appointment.    Pt is a 38 year old female with a hx of RENETTA, mood disorder NOS who presents for follow up treatment. She is currently taking Wellbutrin-SR 100mg BID which we incd from 150mg QD last visit to address mild depression.    Between visits, pt reports that she feel the increased dose of wellbutrin may have been overly stimulating. She cites increased restlensess and agitation with minimal improvement in depressive symptoms. She has been resistant to SSRis historically due to reported weight gain, so we did discuss potential of therapy with SNRIs. She would like some time to consider this and will message me next week with her decision.     She denies feeling hopeless, worthless today but still struggles with apathy and lack of motivation. Notes anhedonia almost daily.Sleeping fine. She is stable overall.       Past Psychiatric hx: : Pt. is a 35 y/o female with a hx of RENETTA, mood disorder NOS who established care with me 2/19.. She has a history of miscarriage in 2017. She came to me taking Zoloft 100mg QD, which was prescribed by her OB-GYN following the birth of her second child on  "09/03/2018, due to post-partum depression. She reports that this medication has possibly made her anxiety slightly worse. It has also made her "apathetic and emotionless." She reports that she would like to d/c the Zoloft and try something new. Previous med trials include Lexapro (prescribed at age 17 for anxiety, reports efficacy), Pristiq (was unsure if this worked due to concurrent use with Vyvanse), and Vyvanse.      She is currently taking Wellbutrin-SR 200mg QD, after a recent transition from Wellbutrin-XL. Pt has been adamant about monotherapy with Wellbutrin due to reading various sources and forums online. I have educated her that this med can potentially increase anxiety, and is better suited for treating depression. She states that she has been reading forums where people have reported better results with SR as opposed to XL. She then expressed her desire to be switched from XL to SR Since switching from XL to SR formulations, she reports a modest improvement in mood and anxiety levels. She also endorses "times where I just get happy" throughout the day. Fatigue has been problematic for her in the past, but she feels the Wellbutrin has helped with this. Pt states "I was just driving around the other day and told my , wow what a beautiful day. He was like wow, where did that come from?"    Pt has long experienced anxiety and characterizes herself as a "worrier". Following a miscarriage in 2017, her constant worry of herself getting cancer has now turned into worry about her children getting cancer. She endorses low motivation, poor energy, poor concentration, irritability, muscle tension.        Past Medical hx:   Past Medical History:   Diagnosis Date    ADD (attention deficit disorder)     Anxiety     Cystic hygroma     Diabetes mellitus     gdm        Interim hx:  Medication changes last visit: none  Anxiety: inc;d  Depression: denies     Denies suicidal/homicidal ideations.  Denies " hopelessness/worthlessness.    Denies auditory/visual hallucinations      Alcohol: 4-5 nights weekly, one drink of liquor nightly  Drug: denies  Caffeine: 1 cup coffee daily  Tobacco: denies      Review of Systems   PSYCHIATRIC: Pertinent items are noted in the narrative.        CONSTITUTIONAL: weight stable        M/S: no pain today         ENT: no allergies noted today        ABD: no n/v/d     Past Medical, Family and Social History: The patient's past medical, family and social history have been reviewed and updated as appropriate within the electronic medical record. See encounter notes.     Medication: Wellbutrin SR 200mg QD     Compliance: yes      Side effects: tolerates     Risk Parameters:  Patient reports no suicidal ideation  Patient reports no homicidal ideation  Patient reports no self-injurious behavior  Patient reports no violent behavior     Exam (detailed: at least 9 elements; comprehensive: all 15 elements)   Constitutional  Vitals:  Most recent vital signs, dated less than 90 days prior to this appointment, were reviewed. There were no vitals taken for this visit.     General:  unremarkable, age appropriate, casual attire, good eye contact, good rapport       Musculoskeletal  Muscle Strength/Tone:  no flaccidity, no tremor    Gait & Station:  normal      Psychiatric                       Speech:  normal tone, normal rate, rhythm, and volume   Mood & Affect:   Euthymic, congruent, appropriate         Thought Process:   Goal directed; Linear    Associations:   intact   Thought Content:   No SI/HI, delusions, or paranoia, no AV/VH   Insight & Judgement:   Good, adequate to circumstances   Orientation:   grossly intact; alert and oriented x 4    Memory:  intact for content of interview    Language:  grossly intact, can repeat    Attention Span  : Grossly intact for content of interview   Fund of Knowledge:   intact and appropriate to age and level of education        Assessment and Diagnosis    Status/Progress: Based on the examination today, the patient's problem(s) is/are under fair control.  New problems have not been presented today. Comorbidities are not currently complicating management of the primary condition.      Impression:      Pt is a 38 year old female with a hx of RENETTA, mood disorder NOS who presents for follow up treatment. She is currently taking Wellbutrin-SR 100mg BID which we incd from 150mg QD last visit to address mild depression.    Between visits, pt reports that she feel the increased dose of wellbutrin may have been overly stimulating. She cites increased restlensess and agitation with minimal improvement in depressive symptoms. She has been resistant to SSRis historically due to reported weight gain, so we did discuss potential of therapy with SNRIs. She would like some time to consider this and will message me next week with her decision.     She denies feeling hopeless, worthless today but still struggles with apathy and lack of motivation. Notes anhedonia almost daily.Sleeping fine. She is stable overall.     Diagnosis: RENETTA, mood disorder, unspecified    Intervention/Counseling/Treatment Plan   Medication Management:      1) Dec wellbutrin xr to 150mg qd    2. Call to report any worsening of symptoms or problems with the medication. Pt instructed to go to ER with thoughts of harming self, others     3. Patient given contact # for psychotherapists at [facility] and also instructed she may check with insurance for list of providers.      4. Labs: no new orders       Return to clinic: 6 months - self schedule    -Spent 30min face to face with the pt; >50% time spent in counseling   -Supportive therapy and psychoeducation provided  -R/B/SE's of medications discussed with the pt who expresses understanding and chooses to take medications as prescribed.   -Pt instructed to call clinic, 911 or go to nearest emergency room if sxs worsen or pt is in   crisis. The pt expresses  understanding.    Waldemar Michele, NP

## 2024-09-09 ENCOUNTER — PATIENT MESSAGE (OUTPATIENT)
Dept: PSYCHIATRY | Facility: CLINIC | Age: 39
End: 2024-09-09
Payer: COMMERCIAL

## 2024-09-09 RX ORDER — FLUOXETINE 10 MG/1
10 CAPSULE ORAL DAILY
Qty: 30 CAPSULE | Refills: 3 | Status: SHIPPED | OUTPATIENT
Start: 2024-09-09 | End: 2025-09-09

## 2024-10-25 ENCOUNTER — PATIENT MESSAGE (OUTPATIENT)
Dept: PSYCHIATRY | Facility: CLINIC | Age: 39
End: 2024-10-25
Payer: COMMERCIAL

## 2024-10-25 RX ORDER — FLUOXETINE 10 MG/1
10 TABLET ORAL DAILY
Qty: 30 TABLET | Refills: 2 | Status: SHIPPED | OUTPATIENT
Start: 2024-10-25 | End: 2025-10-25

## 2024-12-03 RX ORDER — BUPROPION HYDROCHLORIDE 150 MG/1
150 TABLET, EXTENDED RELEASE ORAL
Qty: 90 TABLET | Refills: 1 | Status: SHIPPED | OUTPATIENT
Start: 2024-12-03

## 2025-01-24 RX ORDER — FLUOXETINE 10 MG/1
10 TABLET ORAL
Qty: 90 TABLET | Refills: 0 | Status: SHIPPED | OUTPATIENT
Start: 2025-01-24

## 2025-02-25 ENCOUNTER — OFFICE VISIT (OUTPATIENT)
Dept: PSYCHIATRY | Facility: CLINIC | Age: 40
End: 2025-02-25
Payer: COMMERCIAL

## 2025-02-25 DIAGNOSIS — F41.1 GAD (GENERALIZED ANXIETY DISORDER): Primary | ICD-10-CM

## 2025-02-25 DIAGNOSIS — F39 UNSPECIFIED MOOD (AFFECTIVE) DISORDER: ICD-10-CM

## 2025-02-25 PROCEDURE — 98006 SYNCH AUDIO-VIDEO EST MOD 30: CPT | Mod: 95,,, | Performed by: NURSE PRACTITIONER

## 2025-02-25 PROCEDURE — 1159F MED LIST DOCD IN RCRD: CPT | Mod: CPTII,95,, | Performed by: NURSE PRACTITIONER

## 2025-02-25 PROCEDURE — 1160F RVW MEDS BY RX/DR IN RCRD: CPT | Mod: CPTII,95,, | Performed by: NURSE PRACTITIONER

## 2025-02-25 RX ORDER — BUPROPION HYDROCHLORIDE 200 MG/1
200 TABLET, EXTENDED RELEASE ORAL DAILY
Qty: 30 TABLET | Refills: 1 | Status: SHIPPED | OUTPATIENT
Start: 2025-02-25 | End: 2026-02-25

## 2025-02-25 NOTE — PROGRESS NOTES
Outpatient Psychiatry Follow-Up Visit    Clinical Status of Patient: Outpatient (Virtual)  02/25/2025   [address] [facility] [address]     [phone] (M)    Visit type: Virtual visit with synchronous audio and video  Each patient to whom he or she provides medical services by telemedicine is:  (1) informed of the relationship between the physician and patient and the respective role of any other health care provider with respect to management of the patient; and (2) notified that he or she may decline to receive medical services by telemedicine and may withdraw from such care at any time.    Chief Complaint: Pt is a 39 year old female who presents today for a follow-up. Met with patient.       Interval History and Content of Current Session:  Interim Events/Subjective Report/Content of Current Session: follow up appointment.    Pt is a 39 year old female with a hx of RENETTA, mood disorder NOS who presents for follow up treatment. She is currently taking Wellbutrin-SR 100mg BID which we incd from 150mg QD last visit to address mild depression and fatigue.    Since the last session, the patient reports a progressive worsening of mood, describing persistent feelings of sadness and low mood without a clear trigger. They report increased anhedonia, apathy, and a lack of motivation. While they occasionally feel worthless, they deny feelings of hopelessness as well as any suicidal or homicidal ideation. Sleep, energy levels, and focus have been negatively affected, and the patient notes a decline in their overall quality of life.    Between sessions, the patient reports improvement in anxiety, which is now well-managed. They deny experiencing any major exacerbations and report good stress tolerance. The patient does not report excessive or unproductive worrying and denies somatic symptoms of anxiety, such as restlessness, muscle tension, or chest tightness. They also deny excessive irritability and indicate a  "good ability to manage frustration. The patient reports sleeping well, with good appetite and energy levels. Overall, they are stable and functioning at a high level.      Past Psychiatric hx: : Pt. is a 33 y/o female with a hx of RENETTA, mood disorder NOS who established care with me 2/19.. She has a history of miscarriage in 2017. She came to me taking Zoloft 100mg QD, which was prescribed by her OB-GYN following the birth of her second child on 09/03/2018, due to post-partum depression. She reports that this medication has possibly made her anxiety slightly worse. It has also made her "apathetic and emotionless." She reports that she would like to d/c the Zoloft and try something new. Previous med trials include Lexapro (prescribed at age 17 for anxiety, reports efficacy), Pristiq (was unsure if this worked due to concurrent use with Vyvanse), and Vyvanse.      She is currently taking Wellbutrin-SR 200mg QD, after a recent transition from Wellbutrin-XL. Pt has been adamant about monotherapy with Wellbutrin due to reading various sources and forums online. I have educated her that this med can potentially increase anxiety, and is better suited for treating depression. She states that she has been reading forums where people have reported better results with SR as opposed to XL. She then expressed her desire to be switched from XL to SR Since switching from XL to SR formulations, she reports a modest improvement in mood and anxiety levels. She also endorses "times where I just get happy" throughout the day. Fatigue has been problematic for her in the past, but she feels the Wellbutrin has helped with this. Pt states "I was just driving around the other day and told my , wow what a beautiful day. He was like wow, where did that come from?"    Pt has long experienced anxiety and characterizes herself as a "worrier". Following a miscarriage in 2017, her constant worry of herself getting cancer has now turned into " worry about her children getting cancer. She endorses low motivation, poor energy, poor concentration, irritability, muscle tension.        Past Medical hx:   Past Medical History:   Diagnosis Date    ADD (attention deficit disorder)     Anxiety     Cystic hygroma     Diabetes mellitus     gdm        Interim hx:  Medication changes last visit: none  Anxiety: inc;d  Depression: denies     Denies suicidal/homicidal ideations.  Denies hopelessness/worthlessness.    Denies auditory/visual hallucinations      Alcohol: 4-5 nights weekly, one drink of liquor nightly  Drug: denies  Caffeine: 1 cup coffee daily  Tobacco: denies      Review of Systems   PSYCHIATRIC: Pertinent items are noted in the narrative.        CONSTITUTIONAL: weight stable        M/S: no pain today         ENT: no allergies noted today        ABD: no n/v/d     Past Medical, Family and Social History: The patient's past medical, family and social history have been reviewed and updated as appropriate within the electronic medical record. See encounter notes.     Medication: Wellbutrin SR 200mg QD     Compliance: yes      Side effects: tolerates     Risk Parameters:  Patient reports no suicidal ideation  Patient reports no homicidal ideation  Patient reports no self-injurious behavior  Patient reports no violent behavior     Exam (detailed: at least 9 elements; comprehensive: all 15 elements)   Constitutional  Vitals:  Most recent vital signs, dated less than 90 days prior to this appointment, were reviewed. There were no vitals taken for this visit.     General:  unremarkable, age appropriate, casual attire, good eye contact, good rapport       Musculoskeletal  Muscle Strength/Tone:  no flaccidity, no tremor    Gait & Station:  normal      Psychiatric                       Speech:  normal tone, normal rate, rhythm, and volume   Mood & Affect:   Euthymic, congruent, appropriate         Thought Process:   Goal directed; Linear    Associations:   intact    Thought Content:   No SI/HI, delusions, or paranoia, no AV/VH   Insight & Judgement:   Good, adequate to circumstances   Orientation:   grossly intact; alert and oriented x 4    Memory:  intact for content of interview    Language:  grossly intact, can repeat    Attention Span  : Grossly intact for content of interview   Fund of Knowledge:   intact and appropriate to age and level of education        Assessment and Diagnosis   Status/Progress: Based on the examination today, the patient's problem(s) is/are under fair control.  New problems have not been presented today. Comorbidities are not currently complicating management of the primary condition.      Impression:    Pt is a 39 year old female with a hx of RENETTA, mood disorder NOS who presents for follow up treatment. She is currently taking Wellbutrin-SR 100mg BID which we incd from 150mg QD last visit to address mild depression and fatigue.    Since the last session, the patient reports a progressive worsening of mood, describing persistent feelings of sadness and low mood without a clear trigger. They report increased anhedonia, apathy, and a lack of motivation. While they occasionally feel worthless, they deny feelings of hopelessness as well as any suicidal or homicidal ideation. Sleep, energy levels, and focus have been negatively affected, and the patient notes a decline in their overall quality of life.    Between sessions, the patient reports improvement in anxiety, which is now well-managed. They deny experiencing any major exacerbations and report good stress tolerance. The patient does not report excessive or unproductive worrying and denies somatic symptoms of anxiety, such as restlessness, muscle tension, or chest tightness. They also deny excessive irritability and indicate a good ability to manage frustration. The patient reports sleeping well, with good appetite and energy levels. Overall, they are stable and functioning at a high  level.        Diagnosis: RENETTA, mood disorder, unspecified    Intervention/Counseling/Treatment Plan   Medication Management:      1) Inc Wellbutrin-SR 200mg QD for fatigue    2) Cont Prozac 5mg QD (pt explicitly requests this dose)    3. Call to report any worsening of symptoms or problems with the medication. Pt instructed to go to ER with thoughts of harming self, others     4. Patient given contact # for psychotherapists at [facility] and also instructed she may check with insurance for list of providers.      5. Labs: no new orders       Return to clinic: 6 months - self schedule    -Spent 30min face to face with the pt; >50% time spent in counseling   -Supportive therapy and psychoeducation provided  -R/B/SE's of medications discussed with the pt who expresses understanding and chooses to take medications as prescribed.   -Pt instructed to call clinic, 911 or go to nearest emergency room if sxs worsen or pt is in   crisis. The pt expresses understanding.    Waldemar Michele, NP

## 2025-03-21 RX ORDER — BUPROPION HYDROCHLORIDE 200 MG/1
200 TABLET, EXTENDED RELEASE ORAL
Qty: 90 TABLET | Refills: 1 | Status: SHIPPED | OUTPATIENT
Start: 2025-03-21